# Patient Record
Sex: FEMALE | Race: WHITE | NOT HISPANIC OR LATINO | Employment: FULL TIME | URBAN - NONMETROPOLITAN AREA
[De-identification: names, ages, dates, MRNs, and addresses within clinical notes are randomized per-mention and may not be internally consistent; named-entity substitution may affect disease eponyms.]

---

## 2017-03-02 DIAGNOSIS — Z12.31 VISIT FOR SCREENING MAMMOGRAM: Primary | ICD-10-CM

## 2017-11-21 ENCOUNTER — OFFICE VISIT (OUTPATIENT)
Dept: FAMILY MEDICINE | Facility: OTHER | Age: 42
End: 2017-11-21
Attending: FAMILY MEDICINE
Payer: OTHER GOVERNMENT

## 2017-11-21 ENCOUNTER — TELEPHONE (OUTPATIENT)
Dept: FAMILY MEDICINE | Facility: OTHER | Age: 42
End: 2017-11-21

## 2017-11-21 VITALS
HEIGHT: 65 IN | SYSTOLIC BLOOD PRESSURE: 118 MMHG | BODY MASS INDEX: 33.09 KG/M2 | OXYGEN SATURATION: 98 % | TEMPERATURE: 98.2 F | WEIGHT: 198.6 LBS | DIASTOLIC BLOOD PRESSURE: 74 MMHG | HEART RATE: 105 BPM

## 2017-11-21 DIAGNOSIS — J06.9 VIRAL URI: ICD-10-CM

## 2017-11-21 DIAGNOSIS — R30.0 DYSURIA: Primary | ICD-10-CM

## 2017-11-21 LAB
ALBUMIN UR-MCNC: 10 MG/DL
APPEARANCE UR: CLEAR
BACTERIA #/AREA URNS HPF: ABNORMAL /HPF
BILIRUB UR QL STRIP: NEGATIVE
COLOR UR AUTO: YELLOW
GLUCOSE UR STRIP-MCNC: NEGATIVE MG/DL
HGB UR QL STRIP: NEGATIVE
KETONES UR STRIP-MCNC: NEGATIVE MG/DL
LEUKOCYTE ESTERASE UR QL STRIP: NEGATIVE
MUCOUS THREADS #/AREA URNS LPF: PRESENT /LPF
NITRATE UR QL: NEGATIVE
PH UR STRIP: 5.5 PH (ref 4.7–8)
RBC #/AREA URNS AUTO: 2 /HPF (ref 0–2)
SOURCE: ABNORMAL
SP GR UR STRIP: 1.02 (ref 1–1.03)
SQUAMOUS #/AREA URNS AUTO: 3 /HPF (ref 0–1)
UROBILINOGEN UR STRIP-MCNC: NORMAL MG/DL (ref 0–2)
WBC #/AREA URNS AUTO: 2 /HPF (ref 0–2)

## 2017-11-21 PROCEDURE — 99213 OFFICE O/P EST LOW 20 MIN: CPT | Performed by: FAMILY MEDICINE

## 2017-11-21 PROCEDURE — 81001 URINALYSIS AUTO W/SCOPE: CPT | Performed by: FAMILY MEDICINE

## 2017-11-21 ASSESSMENT — ANXIETY QUESTIONNAIRES
4. TROUBLE RELAXING: NOT AT ALL
5. BEING SO RESTLESS THAT IT IS HARD TO SIT STILL: NOT AT ALL
2. NOT BEING ABLE TO STOP OR CONTROL WORRYING: NOT AT ALL
3. WORRYING TOO MUCH ABOUT DIFFERENT THINGS: NOT AT ALL
6. BECOMING EASILY ANNOYED OR IRRITABLE: NOT AT ALL
1. FEELING NERVOUS, ANXIOUS, OR ON EDGE: NOT AT ALL
GAD7 TOTAL SCORE: 0
7. FEELING AFRAID AS IF SOMETHING AWFUL MIGHT HAPPEN: NOT AT ALL

## 2017-11-21 ASSESSMENT — PAIN SCALES - GENERAL: PAINLEVEL: MODERATE PAIN (4)

## 2017-11-21 ASSESSMENT — ASTHMA QUESTIONNAIRES
QUESTION_1 LAST FOUR WEEKS HOW MUCH OF THE TIME DID YOUR ASTHMA KEEP YOU FROM GETTING AS MUCH DONE AT WORK, SCHOOL OR AT HOME: NONE OF THE TIME
QUESTION_2 LAST FOUR WEEKS HOW OFTEN HAVE YOU HAD SHORTNESS OF BREATH: ONCE OR TWICE A WEEK
ACT_TOTALSCORE: 22
QUESTION_5 LAST FOUR WEEKS HOW WOULD YOU RATE YOUR ASTHMA CONTROL: WELL CONTROLLED
QUESTION_3 LAST FOUR WEEKS HOW OFTEN DID YOUR ASTHMA SYMPTOMS (WHEEZING, COUGHING, SHORTNESS OF BREATH, CHEST TIGHTNESS OR PAIN) WAKE YOU UP AT NIGHT OR EARLIER THAN USUAL IN THE MORNING: ONCE OR TWICE
QUESTION_4 LAST FOUR WEEKS HOW OFTEN HAVE YOU USED YOUR RESCUE INHALER OR NEBULIZER MEDICATION (SUCH AS ALBUTEROL): NOT AT ALL

## 2017-11-21 ASSESSMENT — PATIENT HEALTH QUESTIONNAIRE - PHQ9: SUM OF ALL RESPONSES TO PHQ QUESTIONS 1-9: 0

## 2017-11-21 NOTE — NURSING NOTE
"Chief Complaint   Patient presents with     URI     chest, nasal       Initial /74 (BP Location: Right arm, Patient Position: Sitting, Cuff Size: Adult Regular)  Temp 98.2  F (36.8  C) (Tympanic)  Ht 5' 5\" (1.651 m)  Wt 198 lb 9.6 oz (90.1 kg)  BMI 33.05 kg/m2 Estimated body mass index is 33.05 kg/(m^2) as calculated from the following:    Height as of this encounter: 5' 5\" (1.651 m).    Weight as of this encounter: 198 lb 9.6 oz (90.1 kg).  Medication Reconciliation: complete     Maritza Ardon    "

## 2017-11-21 NOTE — LETTER
My Asthma Action Plan  Name: Shayy Stratton   YOB: 1975  Date: 11/21/2017   My doctor: Jacqui Lee MD   My clinic: [facility] [place]        My Control Medicine: Albuterol  My Rescue Medicine: Albuterol (Proair/Ventolin/Proventil) inhaler inhale 2 puffs every 4 hrs prn   My Asthma Severity: intermittent  Avoid your asthma triggers: smoke, upper respiratory infections, mold, strong odors and fumes, exercise or sports and cold air               GREEN ZONE   Good Control    I feel good    No cough or wheeze    Can work, sleep and play without asthma symptoms       Take your asthma control medicine every day.     1. If exercise triggers your asthma, take your rescue medication    15 minutes before exercise or sports, and    During exercise if you have asthma symptoms  2. Spacer to use with inhaler: If you have a spacer, make sure to use it with your inhaler             YELLOW ZONE Getting Worse  I have ANY of these:    I do not feel good    Cough or wheeze    Chest feels tight    Wake up at night   1. Keep taking your Green Zone medications  2. Start taking your rescue medicine:    every 20 minutes for up to 1 hour. Then every 4 hours for 24-48 hours.  3. If you stay in the Yellow Zone for more than 12-24 hours, contact your doctor.  4. If you do not return to the Green Zone in 12-24 hours or you get worse, start taking your oral steroid medicine if prescribed by your provider.           RED ZONE Medical Alert - Get Help  I have ANY of these:    I feel awful    Medicine is not helping    Breathing getting harder    Trouble walking or talking    Nose opens wide to breathe       1. Take your rescue medicine NOW  2. If your provider has prescribed an oral steroid medicine, start taking it NOW  3. Call your doctor NOW  4. If you are still in the Red Zone after 20 minutes and you have not reached your doctor:    Take your rescue medicine again and    Call 911 or go to the emergency room right  away    See your regular doctor within 2 weeks of an Emergency Room or Urgent Care visit for follow-up treatment.        Electronically signed by: Maritza Ardon, November 21, 2017    Annual Reminders:  Meet with Asthma Educator,  Flu Shot in the Fall, consider Pneumonia Vaccination for patients with asthma (aged 19 and older).    Pharmacy:    [facility] [place] [place] - [address] [facility] [id number] [place] [address], [place] - [address] [facility]  [address]  [facility] [id number] [place] [facility], [place] - [address]  [facility] [address] [place] [facility], [place] - [address]                    Asthma Triggers  How To Control Things That Make Your Asthma Worse    Triggers are things that make your asthma worse.  Look at the list below to help you find your triggers and what you can do about them.  You can help prevent asthma flare-ups by staying away from your triggers.      Trigger                                                          What you can do   Cigarette Smoke  Tobacco smoke can make asthma worse. Do not allow smoking in your home, car or around you.  Be sure no one smokes at a child s day care or school.  If you smoke, ask your health care provider for ways to help you quit.  Ask family members to quit too.  Ask your health care provider for a referral to Quit Plan to help you quit smoking, or call 6-089-677-PLAN.     Colds, Flu, Bronchitis  These are common triggers of asthma. Wash your hands often.  Don t touch your eyes, nose or mouth.  Get a flu shot every year.     Dust Mites  These are tiny bugs that live in cloth or carpet. They are too small to see. Wash sheets and blankets in hot water every week.   Encase pillows and mattress in dust mite proof covers.  Avoid having carpet if you can. If you have carpet, vacuum weekly.   Use a dust mask and HEPA vacuum.   Pollen and Outdoor Mold  Some people are allergic to trees, grass, or weed pollen, or molds. Try to keep your windows  closed.  Limit time out doors when pollen count is high.   Ask you health care provider about taking medicine during allergy season.     Animal Dander  Some people are allergic to skin flakes, urine or saliva from pets with fur or feathers. Keep pets with fur or feathers out of your home.    If you can t keep the pet outdoors, then keep the pet out of your bedroom.  Keep the bedroom door closed.  Keep pets off cloth furniture and away from stuffed toys.     Mice, Rats, and Cockroaches  Some people are allergic to the waste from these pests.   Cover food and garbage.  Clean up spills and food crumbs.  Store grease in the refrigerator.   Keep food out of the bedroom.   Indoor Mold  This can be a trigger if your home has high moisture. Fix leaking faucets, pipes, or other sources of water.   Clean moldy surfaces.  Dehumidify basement if it is damp and smelly.   Smoke, Strong Odors, and Sprays  These can reduce air quality. Stay away from strong odors and sprays, such as perfume, powder, hair spray, paints, smoke incense, paint, cleaning products, candles and new carpet.   Exercise or Sports  Some people with asthma have this trigger. Be active!  Ask your doctor about taking medicine before sports or exercise to prevent symptoms.    Warm up for 5-10 minutes before and after sports or exercise.     Other Triggers of Asthma  Cold air:  Cover your nose and mouth with a scarf.  Sometimes laughing or crying can be a trigger.  Some medicines and food can trigger asthma.

## 2017-11-21 NOTE — PROGRESS NOTES
SUBJECTIVE:   Shayy Stratton is a 42 year old female who presents to clinic today for the following health issues:      RESPIRATORY SYMPTOMS      Duration: Saturday 11/18/17    Description  nasal congestion, sore throat, facial pain/pressure, cough, fever, chills, fatigue/malaise, hoarse voice, nausea and SOB when walking    Severity: moderate    Accompanying signs and symptoms: see above    History (predisposing factors):  asthma    Precipitating or alleviating factors: None    Therapies tried and outcome:  rest and fluids tea for colds, tea vapor rub, some relief    URINARY TRACT SYMPTOMS      Duration: 3-4 days    Description  frequency, urgency and incontinence when coughing    Intensity:  mild    Accompanying signs and symptoms:  Fever/chills: YES  Flank pain no   Nausea and vomiting: no   Vaginal symptoms: none  Abdominal/Pelvic Pain: no     History  History of frequent UTI's: YES  History of kidney stones: no   Sexually Active: YES  Possibility of pregnancy: No    Precipitating or alleviating factors: None    Therapies tried and outcome: cranberry suppliments, D- Mannose powder   Outcome: have helped    Problem list and histories reviewed & adjusted, as indicated.  Additional history: as documented    Current Outpatient Prescriptions   Medication Sig Dispense Refill     Misc Natural Products (CRANBLADDER RELEAF PO)        multivitamin, therapeutic with minerals (MULTI-VITAMIN) TABS Take 1 tablet by mouth daily       albuterol (PROAIR HFA, PROVENTIL HFA, VENTOLIN HFA) 108 (90 BASE) MCG/ACT inhaler Inhale 2 puffs into the lungs every 4 hours as needed 1 Inhaler 1     Cranberry 450 MG TABS Take  by mouth. daily       Probiotic Product (PROBIOTIC & ACIDOPHILUS EX ST) CAPS Take  by mouth. Daily       Labs reviewed in EPIC    Reviewed and updated as needed this visit by clinical staffTobacco  Allergies  Meds       Reviewed and updated as needed this visit by Provider         ROS:  Constitutional, HEENT,  "cardiovascular, pulmonary, gi and gu systems are negative, except as otherwise noted.      OBJECTIVE:                                                    /74 (BP Location: Right arm, Patient Position: Sitting, Cuff Size: Adult Regular)  Pulse 105  Temp 98.2  F (36.8  C) (Tympanic)  Ht 5' 5\" (1.651 m)  Wt 198 lb 9.6 oz (90.1 kg)  SpO2 98%  BMI 33.05 kg/m2  Body mass index is 33.05 kg/(m^2).  GENERAL APPEARANCE: healthy, alert and no distress  HENT: ear canals and TM's normal and nose and mouth without ulcers or lesions  NECK: no adenopathy, no asymmetry, masses, or scars and thyroid normal to palpation  RESP: lungs clear to auscultation - no rales, rhonchi or wheezes  CV: regular rates and rhythm, normal S1 S2, no S3 or S4 and no murmur, click or rub  PSYCH: mentation appears normal and affect normal/bright       ASSESSMENT/PLAN:                                                    1. Dysuria    - UA reflex to Microscopic and Culture    2. Viral URI    1.  Nasal saline rinse/spray 2-3x/day (brands:  Ocean, Ayr, Homar-Med, etc)  2.  Afrin nasal spray (only use 3 days, not longer!)  1-2x/day  3.  claritin or zrytec daily (always buy cheapest brand)    Patient was agreeable to this plan and had no further questions.  See Patient Instructions    Jacqui Lee MD  [facility] [place]  "

## 2017-11-21 NOTE — MR AVS SNAPSHOT
"              After Visit Summary   11/21/2017    Shayy Stratton    MRN: [id number]           Patient Information     Date Of Birth          1975        Visit Information        Provider Department      11/21/2017 2:30 PM Jacqui Lee MD [facility]        Today's Diagnoses     Dysuria    -  1    Viral URI           Follow-ups after your visit        Who to contact     If you have questions or need follow up information about today's clinic visit or your schedule please contact [facility] directly at [phone].  Normal or non-critical lab and imaging results will be communicated to you by Foodisthart, letter or phone within 4 business days after the clinic has received the results. If you do not hear from us within 7 days, please contact the clinic through [employer] or phone. If you have a critical or abnormal lab result, we will notify you by phone as soon as possible.  Submit refill requests through [employer] or call your pharmacy and they will forward the refill request to us. Please allow 3 business days for your refill to be completed.          Additional Information About Your Visit        MyChart Information     [employer] gives you secure access to your electronic health record. If you see a primary care provider, you can also send messages to your care team and make appointments. If you have questions, please call your primary care clinic.  If you do not have a primary care provider, please call [phone] and they will assist you.        Care EveryWhere ID     This is your Care EveryWhere ID. This could be used by other organizations to access your [facility] medical records  [id number]        Your Vitals Were     Pulse Temperature Height Pulse Oximetry BMI (Body Mass Index)       105 98.2  F (36.8  C) (Tympanic) 5' 5\" (1.651 m) 98% 33.05 kg/m2        Blood Pressure from Last 3 Encounters:   11/21/17 118/74   04/14/16 118/74   02/26/16 117/70    Weight from Last 3 " Encounters:   11/21/17 198 lb 9.6 oz (90.1 kg)   04/14/16 185 lb (83.9 kg)   02/26/16 185 lb (83.9 kg)              We Performed the Following     UA reflex to Microscopic and Culture          Today's Medication Changes          These changes are accurate as of: 11/21/17  3:34 PM.  If you have any questions, ask your nurse or doctor.               Stop taking these medicines if you haven't already. Please contact your care team if you have questions.     fluticasone 50 MCG/ACT spray   Commonly known as:  FLONASE   Stopped by:  Jacqui Lee MD           nitroFURantoin (macrocrystal-monohydrate) 100 MG capsule   Commonly known as:  MACROBID   Stopped by:  Jacqui Lee MD                    Primary Care Provider Office Phone # Fax #    Jacqui Lee [phone]       [facility] [place] [address]  [place] [address]        Equal Access to Services     [facility] AH: Hadii aad ku hadasho Soomaali, waaxda luqadaha, qaybta kaalmada adeegyada, waxay idiin hayaan adeeg kharasteven la'aurean . So [facility] [phone].    ATENCIÓN: Si habla español, tiene a huizar disposición servicios gratuitos de asistencia lingüística. Llame al [phone].    We comply with applicable federal civil rights laws and [place] laws. We do not discriminate on the basis of race, color, national origin, age, disability, sex, sexual orientation, or gender identity.            Thank you!     Thank you for choosing [facility] [place]  for your care. Our goal is always to provide you with excellent care. Hearing back from our patients is one way we can continue to improve our services. Please take a few minutes to complete the written survey that you may receive in the mail after your visit with us. Thank you!             Your Updated Medication List - Protect others around you: Learn how to safely use, store and throw away your medicines at www.disposemymeds.org.          This list is accurate as of:  11/21/17  3:34 PM.  Always use your most recent med list.                   Brand Name Dispense Instructions for use Diagnosis    albuterol 108 (90 BASE) MCG/ACT Inhaler    PROAIR HFA/PROVENTIL HFA/VENTOLIN HFA    1 Inhaler    Inhale 2 puffs into the lungs every 4 hours as needed    Allergic rhinitis, cause unspecified       Cranberry 450 MG Tabs      Take  by mouth. daily        CRANBLADDER RELEAF PO           Multi-vitamin Tabs tablet      Take 1 tablet by mouth daily        PROBIOTIC & ACIDOPHILUS EX ST Caps      Take  by mouth. Daily

## 2017-11-21 NOTE — TELEPHONE ENCOUNTER
9:08 AM    Reason for Call: OVERBOOK    Patient is having the following symptoms: Poss bronchitis for 4 days.    The patient is requesting an appointment for ASAP with Dr Lee.    Was an appointment offered for this call? Yes  If yes : Appointment type short              Date  12/05/17    Preferred method for responding to this message: Telephone Call  What is your phone number ?  [phone]    If we cannot reach you directly, may we leave a detailed response at the number you provided? Yes    Can this message wait until your PCP/provider returns, if unavailable today? Not applicable    Benita Leyva

## 2017-11-22 ASSESSMENT — ANXIETY QUESTIONNAIRES: GAD7 TOTAL SCORE: 0

## 2017-11-22 ASSESSMENT — ASTHMA QUESTIONNAIRES: ACT_TOTALSCORE: 22

## 2018-01-08 ENCOUNTER — TELEPHONE (OUTPATIENT)
Dept: FAMILY MEDICINE | Facility: OTHER | Age: 43
End: 2018-01-08

## 2018-01-08 ENCOUNTER — TRANSFERRED RECORDS (OUTPATIENT)
Dept: HEALTH INFORMATION MANAGEMENT | Facility: HOSPITAL | Age: 43
End: 2018-01-08

## 2018-01-08 NOTE — TELEPHONE ENCOUNTER
8:46 AM    Reason for Call: Phone Call    Description: Pt called and states that she would like to see if she could get a lab put in for a possible UTI, she would like to see if she could get it sent into [place] [facility]     Was an appointment offered for this call? No  If yes : Appointment type              Date    Preferred method for responding to this message: Telephone Call  What is your phone number ?    If we cannot reach you directly, may we leave a detailed response at the number you provided? Yes    Can this message wait until your PCP/provider returns, if available today? Not applicable, PCP is in     [facility]

## 2018-01-18 ENCOUNTER — OFFICE VISIT (OUTPATIENT)
Dept: FAMILY MEDICINE | Facility: OTHER | Age: 43
End: 2018-01-18
Attending: FAMILY MEDICINE
Payer: OTHER GOVERNMENT

## 2018-01-18 VITALS
SYSTOLIC BLOOD PRESSURE: 120 MMHG | HEIGHT: 65 IN | HEART RATE: 82 BPM | TEMPERATURE: 98.4 F | DIASTOLIC BLOOD PRESSURE: 88 MMHG | BODY MASS INDEX: 33.89 KG/M2 | WEIGHT: 203.4 LBS | OXYGEN SATURATION: 98 % | RESPIRATION RATE: 16 BRPM

## 2018-01-18 DIAGNOSIS — Z87.440 PERSONAL HISTORY OF URINARY TRACT INFECTION: Primary | ICD-10-CM

## 2018-01-18 LAB
ALBUMIN UR-MCNC: NEGATIVE MG/DL
APPEARANCE UR: CLEAR
BILIRUB UR QL STRIP: NEGATIVE
COLOR UR AUTO: YELLOW
GLUCOSE UR STRIP-MCNC: NEGATIVE MG/DL
HGB UR QL STRIP: NEGATIVE
KETONES UR STRIP-MCNC: NEGATIVE MG/DL
LEUKOCYTE ESTERASE UR QL STRIP: NEGATIVE
NITRATE UR QL: NEGATIVE
PH UR STRIP: 5.5 PH (ref 4.7–8)
SOURCE: NORMAL
SP GR UR STRIP: 1.02 (ref 1–1.03)
UROBILINOGEN UR STRIP-MCNC: NORMAL MG/DL (ref 0–2)

## 2018-01-18 PROCEDURE — 81003 URINALYSIS AUTO W/O SCOPE: CPT | Performed by: FAMILY MEDICINE

## 2018-01-18 PROCEDURE — 99213 OFFICE O/P EST LOW 20 MIN: CPT | Performed by: FAMILY MEDICINE

## 2018-01-18 ASSESSMENT — PATIENT HEALTH QUESTIONNAIRE - PHQ9: SUM OF ALL RESPONSES TO PHQ QUESTIONS 1-9: 4

## 2018-01-18 ASSESSMENT — ANXIETY QUESTIONNAIRES
1. FEELING NERVOUS, ANXIOUS, OR ON EDGE: SEVERAL DAYS
5. BEING SO RESTLESS THAT IT IS HARD TO SIT STILL: SEVERAL DAYS
6. BECOMING EASILY ANNOYED OR IRRITABLE: SEVERAL DAYS
GAD7 TOTAL SCORE: 7
3. WORRYING TOO MUCH ABOUT DIFFERENT THINGS: SEVERAL DAYS
IF YOU CHECKED OFF ANY PROBLEMS ON THIS QUESTIONNAIRE, HOW DIFFICULT HAVE THESE PROBLEMS MADE IT FOR YOU TO DO YOUR WORK, TAKE CARE OF THINGS AT HOME, OR GET ALONG WITH OTHER PEOPLE: SOMEWHAT DIFFICULT
2. NOT BEING ABLE TO STOP OR CONTROL WORRYING: SEVERAL DAYS
7. FEELING AFRAID AS IF SOMETHING AWFUL MIGHT HAPPEN: SEVERAL DAYS
4. TROUBLE RELAXING: SEVERAL DAYS

## 2018-01-18 ASSESSMENT — PAIN SCALES - GENERAL: PAINLEVEL: NO PAIN (0)

## 2018-01-18 NOTE — NURSING NOTE
"Chief Complaint   Patient presents with     RECHECK     follow up UTI       Initial /90 (BP Location: Left arm, Patient Position: Sitting, Cuff Size: Adult Regular)  Pulse 82  Temp 98.4  F (36.9  C) (Tympanic)  Resp 16  Ht 5' 5\" (1.651 m)  Wt 203 lb 6.4 oz (92.3 kg)  SpO2 98%  BMI 33.85 kg/m2 Estimated body mass index is 33.85 kg/(m^2) as calculated from the following:    Height as of this encounter: 5' 5\" (1.651 m).    Weight as of this encounter: 203 lb 6.4 oz (92.3 kg).  Medication Reconciliation: complete   Emily Jacques      "

## 2018-01-18 NOTE — PROGRESS NOTES
"  SUBJECTIVE:   Shayy Stratton is a 42 year old female who presents to clinic today for the following health issues:      URINARY TRACT SYMPTOMS      Duration: 2 weeks    Description  follow-up UTI    Intensity:  mild    Accompanying signs and symptoms:  Fever/chills: no   Flank pain no   Nausea and vomiting: no   Vaginal symptoms: none  Abdominal/Pelvic Pain: no     History  History of frequent UTI's: YES  History of kidney stones: no   Sexually Active: YES  Possibility of pregnancy: No    Precipitating or alleviating factors: None    Therapies tried and outcome: course of antibiotics - finished on 1/13/2018, cranberry juice and increase fluid intake   Outcome: Patient states she feels like UTI is gone      Problem list and histories reviewed & adjusted, as indicated.  Additional history: as documented    Current Outpatient Prescriptions   Medication Sig Dispense Refill     D-Mannose POWD Take 1 tsp. by mouth daily       multivitamin, therapeutic with minerals (MULTI-VITAMIN) TABS Take 1 tablet by mouth daily       albuterol (PROAIR HFA, PROVENTIL HFA, VENTOLIN HFA) 108 (90 BASE) MCG/ACT inhaler Inhale 2 puffs into the lungs every 4 hours as needed 1 Inhaler 1     Cranberry 450 MG TABS Take  by mouth. daily       Probiotic Product (PROBIOTIC & ACIDOPHILUS EX ST) CAPS Take  by mouth. Daily       Labs reviewed in EPIC    Reviewed and updated as needed this visit by clinical staffTobacco  Allergies  Meds  Med Hx  Surg Hx  Fam Hx  Soc Hx      Reviewed and updated as needed this visit by Provider         ROS:  Constitutional, HEENT, cardiovascular, pulmonary, gi and gu systems are negative, except as otherwise noted.      OBJECTIVE:                                                    /88  Pulse 82  Temp 98.4  F (36.9  C) (Tympanic)  Resp 16  Ht 5' 5\" (1.651 m)  Wt 203 lb 6.4 oz (92.3 kg)  SpO2 98%  BMI 33.85 kg/m2  Body mass index is 33.85 kg/(m^2).  GENERAL APPEARANCE: healthy, alert and no " distress  RESP: lungs clear to auscultation - no rales, rhonchi or wheezes  CV: regular rates and rhythm, normal S1 S2, no S3 or S4 and no murmur, click or rub  ABDOMEN: soft, nontender, without hepatosplenomegaly or masses and bowel sounds normal  MS: extremities normal- no gross deformities noted and no CVA tenderness  PSYCH: mentation appears normal and affect normal/bright       ASSESSMENT/PLAN:                                                    1. Personal history of urinary tract infection  resolved  - UA reflex to Microscopic and Culture - [place]    Patient was agreeable to this plan and had no further questions.  See Patient Instructions    Jacqui Lee MD  [facility]

## 2018-01-18 NOTE — MR AVS SNAPSHOT
"              After Visit Summary   1/18/2018    Shayy Stratton    MRN: [id number]           Patient Information     Date Of Birth          1975        Visit Information        Provider Department      1/18/2018 4:00 PM [facility]        Today's Diagnoses     Personal history of urinary tract infection    -  1       Follow-ups after your visit        Who to contact     If you have questions or need follow up information about today's clinic visit or your schedule please contact [facility] directly at [phone].  Normal or non-critical lab and imaging results will be communicated to you by [employer], letter or phone within 4 business days after the clinic has received the results. If you do not hear from us within 7 days, please contact the clinic through [employer] or phone. If you have a critical or abnormal lab result, we will notify you by phone as soon as possible.  Submit refill requests through [employer] or call your pharmacy and they will forward the refill request to us. Please allow 3 business days for your refill to be completed.          Additional Information About Your Visit        MyChart Information     [employer] gives you secure access to your electronic health record. If you see a primary care provider, you can also send messages to your care team and make appointments. If you have questions, please call your primary care clinic.  If you do not have a primary care provider, please call [phone] and they will assist you.        Care EveryWhere ID     This is your Care EveryWhere ID. This could be used by other organizations to access your [facility] medical records  [id number]        Your Vitals Were     Pulse Temperature Respirations Height Pulse Oximetry BMI (Body Mass Index)    82 98.4  F (36.9  C) (Tympanic) 16 5' 5\" (1.651 m) 98% 33.85 kg/m2       Blood Pressure from Last 3 Encounters:   01/18/18 120/88   11/21/17 118/74   04/14/16 118/74    " Weight from Last 3 Encounters:   01/18/18 203 lb 6.4 oz (92.3 kg)   11/21/17 198 lb 9.6 oz (90.1 kg)   04/14/16 185 lb (83.9 kg)              We Performed the Following     UA reflex to Microscopic and Culture - [place]          Today's Medication Changes          These changes are accurate as of: 1/18/18  4:52 PM.  If you have any questions, ask your nurse or doctor.               Stop taking these medicines if you haven't already. Please contact your care team if you have questions.     CRANBLADDER RELEAF PO   Stopped by:  Jacqui Lee MD                    Primary Care Provider Office Phone # Fax #    Jacqui Lee [phone]       [facility] [place] [address]  [place] [address]        Equal Access to Services     [facility] : Hadii aad ku hadasho Sokaylene, waaxda luqadaha, qaybta kaalmada jarad, lea davidson . So [facility] [phone].    ATENCIÓN: Si habla español, tiene a huizar disposición servicios gratuitos de asistencia lingüística. Sierra al [phone].    We comply with applicable federal civil rights laws and [place] laws. We do not discriminate on the basis of race, color, national origin, age, disability, sex, sexual orientation, or gender identity.            Thank you!     Thank you for choosing [facility] [place]  for your care. Our goal is always to provide you with excellent care. Hearing back from our patients is one way we can continue to improve our services. Please take a few minutes to complete the written survey that you may receive in the mail after your visit with us. Thank you!             Your Updated Medication List - Protect others around you: Learn how to safely use, store and throw away your medicines at www.disposemymeds.org.          This list is accurate as of: 1/18/18  4:52 PM.  Always use your most recent med list.                   Brand Name Dispense Instructions for use Diagnosis    albuterol 108 (90  BASE) MCG/ACT Inhaler    PROAIR HFA/PROVENTIL HFA/VENTOLIN HFA    1 Inhaler    Inhale 2 puffs into the lungs every 4 hours as needed    Allergic rhinitis, cause unspecified       Cranberry 450 MG Tabs      Take  by mouth. daily        D-Mannose Powd      Take 1 tsp. by mouth daily        Multi-vitamin Tabs tablet      Take 1 tablet by mouth daily        PROBIOTIC & ACIDOPHILUS EX ST Caps      Take  by mouth. Daily

## 2018-01-19 ASSESSMENT — ANXIETY QUESTIONNAIRES: GAD7 TOTAL SCORE: 7

## 2018-02-02 ENCOUNTER — TELEPHONE (OUTPATIENT)
Dept: FAMILY MEDICINE | Facility: OTHER | Age: 43
End: 2018-02-02

## 2018-02-02 DIAGNOSIS — R30.0 DYSURIA: Primary | ICD-10-CM

## 2018-02-02 DIAGNOSIS — R82.90 ABNORMAL URINE FINDINGS: Primary | ICD-10-CM

## 2018-02-02 DIAGNOSIS — R30.0 DYSURIA: ICD-10-CM

## 2018-02-02 LAB
ALBUMIN UR-MCNC: NEGATIVE MG/DL
APPEARANCE UR: CLEAR
BACTERIA #/AREA URNS HPF: ABNORMAL /HPF
BILIRUB UR QL STRIP: NEGATIVE
COLOR UR AUTO: YELLOW
GLUCOSE UR STRIP-MCNC: NEGATIVE MG/DL
HGB UR QL STRIP: NEGATIVE
KETONES UR STRIP-MCNC: NEGATIVE MG/DL
LEUKOCYTE ESTERASE UR QL STRIP: ABNORMAL
MUCOUS THREADS #/AREA URNS LPF: PRESENT /LPF
NITRATE UR QL: NEGATIVE
PH UR STRIP: 6 PH (ref 4.7–8)
RBC #/AREA URNS AUTO: 2 /HPF (ref 0–2)
SOURCE: ABNORMAL
SP GR UR STRIP: 1.02 (ref 1–1.03)
UROBILINOGEN UR STRIP-MCNC: NORMAL MG/DL (ref 0–2)
WBC #/AREA URNS AUTO: 13 /HPF (ref 0–2)

## 2018-02-02 PROCEDURE — 81001 URINALYSIS AUTO W/SCOPE: CPT | Performed by: FAMILY MEDICINE

## 2018-02-02 PROCEDURE — 87086 URINE CULTURE/COLONY COUNT: CPT | Performed by: FAMILY MEDICINE

## 2018-02-02 NOTE — TELEPHONE ENCOUNTER
8:27 AM    Reason for Call: Phone Call    Description: Pt called and stated she believes she has a UTI. Pt has had burning during urination and more frequency of urination. Pt stated she was told by Dr Lee at last appt that she could just come in for labs next time she felt one starting. Pt wondering if someone can put lab orders in? Please call her back at [phone]    Was an appointment offered for this call? No  If yes : Appointment type              Date    Preferred method for responding to this message: Telephone Call  What is your phone number ?    If we cannot reach you directly, may we leave a detailed response at the number you provided? Yes    Can this message wait until your PCP/provider returns, if available today? No, PCP out and would like call back today    Benita Leyva

## 2018-02-04 LAB
BACTERIA SPEC CULT: ABNORMAL
SPECIMEN SOURCE: ABNORMAL

## 2018-02-15 ENCOUNTER — MYC MEDICAL ADVICE (OUTPATIENT)
Dept: FAMILY MEDICINE | Facility: OTHER | Age: 43
End: 2018-02-15

## 2018-02-22 ENCOUNTER — OFFICE VISIT (OUTPATIENT)
Dept: FAMILY MEDICINE | Facility: OTHER | Age: 43
End: 2018-02-22
Attending: FAMILY MEDICINE
Payer: OTHER GOVERNMENT

## 2018-02-22 VITALS
SYSTOLIC BLOOD PRESSURE: 122 MMHG | OXYGEN SATURATION: 100 % | RESPIRATION RATE: 18 BRPM | WEIGHT: 204.4 LBS | HEIGHT: 65 IN | DIASTOLIC BLOOD PRESSURE: 70 MMHG | HEART RATE: 97 BPM | TEMPERATURE: 97.7 F | BODY MASS INDEX: 34.05 KG/M2

## 2018-02-22 DIAGNOSIS — R39.15 URINARY URGENCY: Primary | ICD-10-CM

## 2018-02-22 DIAGNOSIS — M77.12 LATERAL EPICONDYLITIS OF LEFT ELBOW: ICD-10-CM

## 2018-02-22 LAB
ALBUMIN UR-MCNC: NEGATIVE MG/DL
APPEARANCE UR: CLEAR
BILIRUB UR QL STRIP: NEGATIVE
COLOR UR AUTO: NORMAL
GLUCOSE UR STRIP-MCNC: NEGATIVE MG/DL
HGB UR QL STRIP: NEGATIVE
KETONES UR STRIP-MCNC: NEGATIVE MG/DL
LEUKOCYTE ESTERASE UR QL STRIP: NEGATIVE
NITRATE UR QL: NEGATIVE
PH UR STRIP: 6 PH (ref 4.7–8)
SOURCE: NORMAL
SP GR UR STRIP: 1 (ref 1–1.03)
UROBILINOGEN UR STRIP-MCNC: NORMAL MG/DL (ref 0–2)

## 2018-02-22 PROCEDURE — 99213 OFFICE O/P EST LOW 20 MIN: CPT | Performed by: FAMILY MEDICINE

## 2018-02-22 PROCEDURE — 81003 URINALYSIS AUTO W/O SCOPE: CPT | Performed by: FAMILY MEDICINE

## 2018-02-22 ASSESSMENT — PAIN SCALES - GENERAL: PAINLEVEL: NO PAIN (0)

## 2018-02-22 NOTE — MR AVS SNAPSHOT
"              After Visit Summary   2/22/2018    Shayy Stratton    MRN: [id number]           Patient Information     Date Of Birth          1975        Visit Information        Provider Department      2/22/2018 12:15 PM Jacqui Lee MD [facility]        Today's Diagnoses     Urinary urgency    -  1    Lateral epicondylitis of left elbow           Follow-ups after your visit        Who to contact     If you have questions or need follow up information about today's clinic visit or your schedule please contact [facility] directly at [phone].  Normal or non-critical lab and imaging results will be communicated to you by [employer], letter or phone within 4 business days after the clinic has received the results. If you do not hear from us within 7 days, please contact the clinic through [employer] E[employer] or phone. If you have a critical or abnormal lab result, we will notify you by phone as soon as possible.  Submit refill requests through MetGen or call your pharmacy and they will forward the refill request to us. Please allow 3 business days for your refill to be completed.          Additional Information About Your Visit        MyChart Information     Met[employer] gives you secure access to your electronic health record. If you see a primary care provider, you can also send messages to your care team and make appointments. If you have questions, please call your primary care clinic.  If you do not have a primary care provider, please call [phone] and they will assist you.        Care EveryWhere ID     This is your Care EveryWhere ID. This could be used by other organizations to access your [place] medical records  [id number]        Your Vitals Were     Pulse Temperature Respirations Height Pulse Oximetry BMI (Body Mass Index)    97 97.7  F (36.5  C) (Tympanic) 18 5' 5\" (1.651 m) 100% 34.01 kg/m2       Blood Pressure from Last 3 Encounters:   02/22/18 122/70   01/18/18 120/88 "   11/21/17 118/74    Weight from Last 3 Encounters:   02/22/18 204 lb 6.4 oz (92.7 kg)   01/18/18 203 lb 6.4 oz (92.3 kg)   11/21/17 198 lb 9.6 oz (90.1 kg)              We Performed the Following     UA reflex to Microscopic and Culture - [place]          Today's Medication Changes          These changes are accurate as of 2/22/18  1:17 PM.  If you have any questions, ask your nurse or doctor.               Start taking these medicines.        Dose/Directions    order for DME   Used for:  Lateral epicondylitis of left elbow   Started by:  Jacqui Lee MD        Equipment being ordered: Tennis elbow splint   Quantity:  1 Device   Refills:  0            Where to get your medicines      Some of these will need a paper prescription and others can be bought over the counter.  Ask your nurse if you have questions.     Bring a paper prescription for each of these medications     order for DME                Primary Care Provider Office Phone # Fax #    Jacqui Lee [phone]       [facility] [place] [address] [address]        Equal Access to Services     [facility]: Hadii glenny urbina hadasho Sokaylene, waaxda luqadaha, qaybta kaalmada jarad, lea hobbs. So [facility] [phone].    ATENCIÓN: Si habla español, tiene a huizar disposición servicios gratuitos de asistencia lingüística. Llame al [phone].    We comply with applicable federal civil rights laws and [place] laws. We do not discriminate on the basis of race, color, national origin, age, disability, sex, sexual orientation, or gender identity.            Thank you!     Thank you for choosing [facility]  for your care. Our goal is always to provide you with excellent care. Hearing back from our patients is one way we can continue to improve our services. Please take a few minutes to complete the written survey that you may receive in the mail after your visit with us.  Thank you!             Your Updated Medication List - Protect others around you: Learn how to safely use, store and throw away your medicines at www.disposemymeds.org.          This list is accurate as of 2/22/18  1:17 PM.  Always use your most recent med list.                   Brand Name Dispense Instructions for use Diagnosis    albuterol 108 (90 BASE) MCG/ACT Inhaler    PROAIR HFA/PROVENTIL HFA/VENTOLIN HFA    1 Inhaler    Inhale 2 puffs into the lungs every 4 hours as needed    Allergic rhinitis, cause unspecified       Cranberry 450 MG Tabs      Take  by mouth. daily        D-Mannose Powd      Take 1 tsp. by mouth daily        Multi-vitamin Tabs tablet      Take 1 tablet by mouth daily        order for DME     1 Device    Equipment being ordered: Tennis elbow splint    Lateral epicondylitis of left elbow       PROBIOTIC & ACIDOPHILUS EX ST Caps      Take  by mouth. Daily

## 2018-02-22 NOTE — NURSING NOTE
"Chief Complaint   Patient presents with     UTI     Musculoskeletal Problem     Rt elbow       Initial /70 (BP Location: Right arm, Patient Position: Sitting, Cuff Size: Adult Regular)  Pulse 97  Temp 97.7  F (36.5  C) (Tympanic)  Resp 18  Ht 5' 5\" (1.651 m)  Wt 204 lb 6.4 oz (92.7 kg)  SpO2 100%  BMI 34.01 kg/m2 Estimated body mass index is 34.01 kg/(m^2) as calculated from the following:    Height as of this encounter: 5' 5\" (1.651 m).    Weight as of this encounter: 204 lb 6.4 oz (92.7 kg).  Medication Reconciliation: complete   Emily Lizarraga MA  "

## 2018-02-22 NOTE — PROGRESS NOTES
SUBJECTIVE:                                                    Shayy Stratton is a 42 year old female who presents to clinic today for the following health issues:      Musculoskeletal problem/pain      Duration: January    Description  Location: Left elbow    Intensity:  moderate    Accompanying signs and symptoms: sharp pain if lifting or turning wrong    History  Previous similar problem: no   Previous evaluation:  none    Precipitating or alleviating factors:  Trauma or overuse: no   Aggravating factors include: lifting and exercise    Therapies tried and outcome: heat    URINARY TRACT SYMPTOMS      Duration: on and off for several years    Description  frequency, urgency and incontinence    Intensity:  mild    Accompanying signs and symptoms:  Fever/chills: no   Flank pain no   Nausea and vomiting: no   Vaginal symptoms: none  Abdominal/Pelvic Pain: no     History  History of frequent UTI's: YES- since pt was little  History of kidney stones: no   Sexually Active: YES  Possibility of pregnancy: Don't Know    Precipitating or alleviating factors: None    Therapies tried and outcome: cranberry juice   Outcome: no change      Problem list and histories reviewed & adjusted, as indicated.  Additional history: as documented    Patient Active Problem List   Diagnosis     Frequent UTI     Hyperlipidemia with target LDL less than 130     Esophageal reflux     Past Surgical History:   Procedure Laterality Date     APPENDECTOMY       reflux bladder surgery       TONSILLECTOMY         Social History   Substance Use Topics     Smoking status: Never Smoker     Smokeless tobacco: Never Used     Alcohol use 1.2 oz/week     2 Standard drinks or equivalent per week      Comment: occasionally 4-6  drinks weekly     Family History   Problem Relation Age of Onset     DIABETES Mother      Hypertension Mother      Breast Cancer Mother 65     stage 1 -- double mastectomy     CANCER Father      renal cancer/bladder cancer     DIABETES  "Father      diet controlled     Genetic Disorder Sister      mosaic Norton's     Family History Negative Sister      Breast Cancer Maternal Grandmother 48     C.A.D. Maternal Grandfather 62     smoker     Unknown/Adopted Paternal Grandmother      CHF, old age -- 100     C.A.D. Paternal Grandfather 79     smoker     Obesity Brother      possible dyslipidemia         Current Outpatient Prescriptions   Medication Sig Dispense Refill     order for DME Equipment being ordered: Tennis elbow splint 1 Device 0     D-Mannose POWD Take 1 tsp. by mouth daily       multivitamin, therapeutic with minerals (MULTI-VITAMIN) TABS Take 1 tablet by mouth daily       albuterol (PROAIR HFA, PROVENTIL HFA, VENTOLIN HFA) 108 (90 BASE) MCG/ACT inhaler Inhale 2 puffs into the lungs every 4 hours as needed 1 Inhaler 1     Cranberry 450 MG TABS Take  by mouth. daily       Probiotic Product (PROBIOTIC & ACIDOPHILUS EX ST) CAPS Take  by mouth. Daily       Allergies   Allergen Reactions     Levofloxacin Hives     Levaquin - hives on face     Acetaminophen Hives and Rash     Honey-[place]       Aspirin Hives and Rash     Honey-[place]     Calcium Carbonate Hives and Rash     Honey-[place]     Citric Acid Hives and Rash     Honey-[place]     Potassium Bicarbonate Hives and Rash     Honey-[place]     Sodium Bicarbonate Hives and Rash     Honey-[place]     Labs reviewed in EPIC    ROS:  Constitutional, HEENT, cardiovascular, pulmonary, gi and gu systems are negative, except as otherwise noted.    OBJECTIVE:                                                    /70 (BP Location: Right arm, Patient Position: Sitting, Cuff Size: Adult Regular)  Pulse 97  Temp 97.7  F (36.5  C) (Tympanic)  Resp 18  Ht 5' 5\" (1.651 m)  Wt 204 lb 6.4 oz (92.7 kg)  SpO2 100%  BMI 34.01 kg/m2  Body mass index is 34.01 kg/(m^2).  GENERAL APPEARANCE: healthy, alert and no distress  ABDOMEN: soft, nontender, without hepatosplenomegaly or masses and bowel sounds normal  MS: " extremities normal- no gross deformities noted  ORTHO: Elbow Exam: Inspection: no swelling, no ecchymosis  Tender: lateral epicondyle, common extensor tendon and extensor muscle of forearm  Non-tender: medial epicondyle, common flexor tendon, flexor muscle of forearm, supracondylar notch and olecranon bursa  Range of Motion: flexion: normal, extension: normal, supination:  normal, pronation: normal  Strength: not done  Special tests: pain with resisted wrist extension, no pain with resisted middle finger extension, no pain with resisted wrist flexion, no pain with resisted supination:     PSYCH: mentation appears normal and affect normal/bright       ASSESSMENT/PLAN:                                                    1. Urinary urgency  Urine is clear, continue with supplements  - UA reflex to Microscopic and Culture - [place]    2. Lateral epicondylitis of left elbow  [facility] as [facility]  - order for DME; Equipment being ordered: Tennis elbow splint  Dispense: 1 Device; Refill: 0    Patient was agreeable to this plan and had no further questions.  See Patient Instructions    Jacqui Lee MD  [facility] [place]

## 2018-03-08 ENCOUNTER — TELEPHONE (OUTPATIENT)
Dept: FAMILY MEDICINE | Facility: OTHER | Age: 43
End: 2018-03-08

## 2018-03-08 NOTE — TELEPHONE ENCOUNTER
12:15 PM    Reason for Call: Phone Call    Description: Pt called and stated she has been sick with cough and sinus congestion for past week. Pt has tried to take Claritin D and flonase for the sinus congestion/pressure, but it doesn't seem to be helping. Wondering what else over the counter she can use to help with her sinuses? Please call her back at [phone]    Was an appointment offered for this call? No  If yes : Appointment type              Date    Preferred method for responding to this message: Telephone Call  What is your phone number ?    If we cannot reach you directly, may we leave a detailed response at the number you provided? Yes    Can this message wait until your PCP/provider returns, if available today? No, PCP out and hoping someone will call back today    Benita Leyva

## 2018-03-09 ENCOUNTER — OFFICE VISIT (OUTPATIENT)
Dept: FAMILY MEDICINE | Facility: OTHER | Age: 43
End: 2018-03-09
Attending: FAMILY MEDICINE
Payer: OTHER GOVERNMENT

## 2018-03-09 VITALS
SYSTOLIC BLOOD PRESSURE: 116 MMHG | HEART RATE: 94 BPM | WEIGHT: 200.13 LBS | TEMPERATURE: 98.1 F | BODY MASS INDEX: 33.3 KG/M2 | DIASTOLIC BLOOD PRESSURE: 84 MMHG | OXYGEN SATURATION: 98 %

## 2018-03-09 DIAGNOSIS — J06.9 VIRAL URI: Primary | ICD-10-CM

## 2018-03-09 PROCEDURE — 99213 OFFICE O/P EST LOW 20 MIN: CPT | Performed by: FAMILY MEDICINE

## 2018-03-09 ASSESSMENT — ANXIETY QUESTIONNAIRES
5. BEING SO RESTLESS THAT IT IS HARD TO SIT STILL: NOT AT ALL
7. FEELING AFRAID AS IF SOMETHING AWFUL MIGHT HAPPEN: NOT AT ALL
6. BECOMING EASILY ANNOYED OR IRRITABLE: NOT AT ALL
GAD7 TOTAL SCORE: 0
3. WORRYING TOO MUCH ABOUT DIFFERENT THINGS: NOT AT ALL
4. TROUBLE RELAXING: NOT AT ALL
2. NOT BEING ABLE TO STOP OR CONTROL WORRYING: NOT AT ALL
1. FEELING NERVOUS, ANXIOUS, OR ON EDGE: NOT AT ALL

## 2018-03-09 ASSESSMENT — PAIN SCALES - GENERAL: PAINLEVEL: NO PAIN (0)

## 2018-03-09 NOTE — NURSING NOTE
"Chief Complaint   Patient presents with     URI     Sinus Problem       Initial /84  Pulse 94  Temp 98.1  F (36.7  C) (Tympanic)  Wt 200 lb 2 oz (90.8 kg)  SpO2 98%  BMI 33.3 kg/m2 Estimated body mass index is 33.3 kg/(m^2) as calculated from the following:    Height as of 2/22/18: 5' 5\" (1.651 m).    Weight as of this encounter: 200 lb 2 oz (90.8 kg).  Medication Reconciliation: complete     Rachel Amador    "

## 2018-03-09 NOTE — MR AVS SNAPSHOT
After Visit Summary   3/9/2018    Shayy Stratton    MRN: [id number]           Patient Information     Date Of Birth          1975        Visit Information        Provider Department      3/9/2018 9:30 AM Jacqui Lee MD [facility]        Care Instructions      1.  Nasal saline rinse/spray 2-3x/day (brands:  Ocean, Ayr, Homar-Med, etc)  2.  Afrin nasal spray (only use 3 days, not longer!)  1-2x/day  3.  claritin or zrytec daily (always buy cheapest brand)            Follow-ups after your visit        Who to contact     If you have questions or need follow up information about today's clinic visit or your schedule please contact [facility] directly at [phone].  Normal or non-critical lab and imaging results will be communicated to you by MyChart, letter or phone within 4 business days after the clinic has received the results. If you do not hear from us within 7 days, please contact the clinic through MyChart or phone. If you have a critical or abnormal lab result, we will notify you by phone as soon as possible.  Submit refill requests through [employer] or call your pharmacy and they will forward the refill request to us. Please allow 3 business days for your refill to be completed.          Additional Information About Your Visit        MyChart Information     [employer] gives you secure access to your electronic health record. If you see a primary care provider, you can also send messages to your care team and make appointments. If you have questions, please call your primary care clinic.  If you do not have a primary care provider, please call [phone] and they will assist you.        Care EveryWhere ID     This is your Care EveryWhere ID. This could be used by other organizations to access your [place] medical records  [id number]        Your Vitals Were     Pulse Temperature Pulse Oximetry BMI (Body Mass Index)          94 98.1  F (36.7  C) (Tympanic) 98%  33.3 kg/m2         Blood Pressure from Last 3 Encounters:   03/09/18 116/84   02/22/18 122/70   01/18/18 120/88    Weight from Last 3 Encounters:   03/09/18 200 lb 2 oz (90.8 kg)   02/22/18 204 lb 6.4 oz (92.7 kg)   01/18/18 203 lb 6.4 oz (92.3 kg)              Today, you had the following     No orders found for display       Primary Care Provider Office Phone # Fax #    Jacqui Lee [phone]       [facility] [place] [address]  [place] [address]        Equal Access to Services     [facility]: Hadii aad ku hadasho Somamadouali, waaxda luqadaha, qaybta kaalmada adeegyada, lea davidson . So [facility] [phone].    ATENCIÓN: Si habla español, tiene a huizar disposición servicios gratuitos de asistencia lingüística. Llame al [phone].    We comply with applicable federal civil rights laws and [place] laws. We do not discriminate on the basis of race, color, national origin, age, disability, sex, sexual orientation, or gender identity.            Thank you!     Thank you for choosing [facility]  for your care. Our goal is always to provide you with excellent care. Hearing back from our patients is one way we can continue to improve our services. Please take a few minutes to complete the written survey that you may receive in the mail after your visit with us. Thank you!             Your Updated Medication List - Protect others around you: Learn how to safely use, store and throw away your medicines at www.disposemymeds.org.          This list is accurate as of 3/9/18  9:41 AM.  Always use your most recent med list.                   Brand Name Dispense Instructions for use Diagnosis    albuterol 108 (90 BASE) MCG/ACT Inhaler    PROAIR HFA/PROVENTIL HFA/VENTOLIN HFA    1 Inhaler    Inhale 2 puffs into the lungs every 4 hours as needed    Allergic rhinitis, cause unspecified       Cranberry 450 MG Tabs      Take  by mouth. daily        D-Mannose  Powd      Take 1 tsp. by mouth daily        Multi-vitamin Tabs tablet      Take 1 tablet by mouth daily        order for DME     1 Device    Equipment being ordered: Tennis elbow splint    Lateral epicondylitis of left elbow       PROBIOTIC & ACIDOPHILUS EX ST Caps      Take  by mouth. Daily

## 2018-03-09 NOTE — PROGRESS NOTES
SUBJECTIVE:                                                    Shayy Stratton is a 42 year old female who presents to clinic today for the following health issues:      RESPIRATORY SYMPTOMS      Duration: started sunday    Description  nasal congestion, sore throat, facial pain/pressure, cough, fever, headache, fatigue/malaise, hoarse voice and diarrhea    Severity: mild    Accompanying signs and symptoms: None    History (predisposing factors):  asthma    Precipitating or alleviating factors: None    Therapies tried and outcome:  oral decongestant antihistamine    Problem list and histories reviewed & adjusted, as indicated.  Additional history: as documented    Patient Active Problem List   Diagnosis     Frequent UTI     Hyperlipidemia with target LDL less than 130     Esophageal reflux     Past Surgical History:   Procedure Laterality Date     APPENDECTOMY       reflux bladder surgery       TONSILLECTOMY         Social History   Substance Use Topics     Smoking status: Never Smoker     Smokeless tobacco: Never Used     Alcohol use 1.2 oz/week     2 Standard drinks or equivalent per week      Comment: occasionally 4-6  drinks weekly     Family History   Problem Relation Age of Onset     DIABETES Mother      Hypertension Mother      Breast Cancer Mother 65     stage 1 -- double mastectomy     CANCER Father      renal cancer/bladder cancer     DIABETES Father      diet controlled     Genetic Disorder Sister      mosaic Norton's     Family History Negative Sister      Breast Cancer Maternal Grandmother 48     C.A.D. Maternal Grandfather 62     smoker     Unknown/Adopted Paternal Grandmother      CHF, old age -- 100     C.A.D. Paternal Grandfather 79     smoker     Obesity Brother      possible dyslipidemia         Current Outpatient Prescriptions   Medication Sig Dispense Refill     order for DME Equipment being ordered: Tennis elbow splint 1 Device 0     D-Mannose POWD Take 1 tsp. by mouth daily       albuterol  (PROAIR HFA, PROVENTIL HFA, VENTOLIN HFA) 108 (90 BASE) MCG/ACT inhaler Inhale 2 puffs into the lungs every 4 hours as needed 1 Inhaler 1     Cranberry 450 MG TABS Take  by mouth. daily       Probiotic Product (PROBIOTIC & ACIDOPHILUS EX ST) CAPS Take  by mouth. Daily       multivitamin, therapeutic with minerals (MULTI-VITAMIN) TABS Take 1 tablet by mouth daily       Allergies   Allergen Reactions     Levofloxacin Hives     Levaquin - hives on face     Acetaminophen Hives and Rash     Honey-[place]       Aspirin Hives and Rash     Honey-[place]     Calcium Carbonate Hives and Rash     Honey-[place]     Citric Acid Hives and Rash     Honey-[place]     Potassium Bicarbonate Hives and Rash     Honey-[place]     Sodium Bicarbonate Hives and Rash     Honey-[place]     Labs reviewed in EPIC    ROS:  Constitutional, HEENT, cardiovascular, pulmonary, gi and gu systems are negative, except as otherwise noted.    OBJECTIVE:                                                    /84  Pulse 94  Temp 98.1  F (36.7  C) (Tympanic)  Wt 200 lb 2 oz (90.8 kg)  SpO2 98%  BMI 33.3 kg/m2  Body mass index is 33.3 kg/(m^2).  GENERAL APPEARANCE: alert, mild distress and fatigued  HENT: ear canals and TM's normal and nose and mouth without ulcers or lesions, geographic tongue  NECK: no adenopathy, no asymmetry, masses, or scars and thyroid normal to palpation  RESP: lungs clear to auscultation - no rales, rhonchi or wheezes  CV: regular rates and rhythm, normal S1 S2, no S3 or S4 and no murmur, click or rub  PSYCH: mentation appears normal and affect normal/bright       ASSESSMENT/PLAN:                                                    1. Viral URI    1.  Nasal saline rinse/spray 2-3x/day (brands:  Ocean, Ayr, Homar-Med, etc)  2.  Afrin nasal spray (only use 3 days, not longer!)  1-2x/day  3.  claritin or zrytec daily (always buy cheapest brand)      Patient was agreeable to this plan and had no further questions.  See Patient  Instructions    Jacqui Lee MD  [facility]

## 2018-03-09 NOTE — PATIENT INSTRUCTIONS
1.  Nasal saline rinse/spray 2-3x/day (brands:  Ocean, Ayr, Homar-Med, etc)  2.  Afrin nasal spray (only use 3 days, not longer!)  1-2x/day  3.  claritin or zrytec daily (always buy cheapest brand)

## 2018-03-10 ASSESSMENT — PATIENT HEALTH QUESTIONNAIRE - PHQ9: SUM OF ALL RESPONSES TO PHQ QUESTIONS 1-9: 0

## 2018-03-10 ASSESSMENT — ANXIETY QUESTIONNAIRES: GAD7 TOTAL SCORE: 0

## 2018-03-13 ENCOUNTER — TELEPHONE (OUTPATIENT)
Dept: FAMILY MEDICINE | Facility: OTHER | Age: 43
End: 2018-03-13

## 2018-03-13 NOTE — TELEPHONE ENCOUNTER
1:58 PM    Reason for Call: Phone Call    Description: Patient called in to request a lab for a possible uti can labs be put in for her to be tested.    Was an appointment offered for this call? No  If yes : Appointment type              Date    Preferred method for responding to this message: Telephone Call  What is your phone number ? [phone]    If we cannot reach you directly, may we leave a detailed response at the number you provided? Yes    Can this message wait until your PCP/provider returns, if available today? YES,     Loreto Garibay

## 2018-03-13 NOTE — TELEPHONE ENCOUNTER
Spoke with pt and suggested she make an appointment with anyone that was available and if that was not possible that she should go to urgent care.

## 2018-03-28 ENCOUNTER — MYC MEDICAL ADVICE (OUTPATIENT)
Dept: FAMILY MEDICINE | Facility: OTHER | Age: 43
End: 2018-03-28

## 2018-03-28 NOTE — LETTER
March 29, 2018      Shayy Stratton  [address] [place] [id number]        To Whom It May Concern,      The above pt is under my care for her treatment of Asthma.  Her asthma is currently under control.  She uses her albuterol inhaler as needed for cold weather or exercise.  If there are any further questions regarding her asthma please feel free to contact my office.  Thank you.          Sincerely,        Jacqui Lee MD

## 2018-03-28 NOTE — LETTER
March 29, 2018      Shayy Stratton  [address] [place] [id number]        To Whom It May Concern,      The above pt is under my care for her treatment of Asthma.  The patient only uses her albuterol inhaler as needed.  It is mostly used for cold weather and exercise.  If there are any further questions regarding her asthma please feel free to contact my office at any time.  Thank you for you time.            Sincerely,        Jacqui Lee MD

## 2018-04-20 ENCOUNTER — OFFICE VISIT (OUTPATIENT)
Dept: FAMILY MEDICINE | Facility: OTHER | Age: 43
End: 2018-04-20
Attending: FAMILY MEDICINE
Payer: OTHER GOVERNMENT

## 2018-04-20 VITALS
SYSTOLIC BLOOD PRESSURE: 123 MMHG | TEMPERATURE: 98.7 F | DIASTOLIC BLOOD PRESSURE: 86 MMHG | RESPIRATION RATE: 20 BRPM | OXYGEN SATURATION: 98 % | HEART RATE: 87 BPM | BODY MASS INDEX: 34.16 KG/M2 | WEIGHT: 205 LBS | HEIGHT: 65 IN

## 2018-04-20 DIAGNOSIS — M25.561 CHRONIC PAIN OF BOTH KNEES: ICD-10-CM

## 2018-04-20 DIAGNOSIS — M25.562 CHRONIC PAIN OF BOTH KNEES: ICD-10-CM

## 2018-04-20 DIAGNOSIS — G89.29 CHRONIC PAIN OF BOTH KNEES: ICD-10-CM

## 2018-04-20 DIAGNOSIS — J45.41 MODERATE PERSISTENT ASTHMA WITH EXACERBATION: Primary | ICD-10-CM

## 2018-04-20 PROCEDURE — 99214 OFFICE O/P EST MOD 30 MIN: CPT | Performed by: FAMILY MEDICINE

## 2018-04-20 RX ORDER — PREDNISONE 20 MG/1
20 TABLET ORAL 2 TIMES DAILY
Qty: 10 TABLET | Refills: 0 | Status: SHIPPED | OUTPATIENT
Start: 2018-04-20 | End: 2018-05-01

## 2018-04-20 ASSESSMENT — PAIN SCALES - GENERAL: PAINLEVEL: NO PAIN (0)

## 2018-04-20 ASSESSMENT — ANXIETY QUESTIONNAIRES
1. FEELING NERVOUS, ANXIOUS, OR ON EDGE: NOT AT ALL
7. FEELING AFRAID AS IF SOMETHING AWFUL MIGHT HAPPEN: NOT AT ALL
2. NOT BEING ABLE TO STOP OR CONTROL WORRYING: NOT AT ALL
4. TROUBLE RELAXING: NOT AT ALL
GAD7 TOTAL SCORE: 0
5. BEING SO RESTLESS THAT IT IS HARD TO SIT STILL: NOT AT ALL
6. BECOMING EASILY ANNOYED OR IRRITABLE: NOT AT ALL
3. WORRYING TOO MUCH ABOUT DIFFERENT THINGS: NOT AT ALL

## 2018-04-20 NOTE — NURSING NOTE
"Chief Complaint   Patient presents with     URI       Initial /86 (BP Location: Left arm, Patient Position: Sitting, Cuff Size: Adult Regular)  Pulse 87  Temp 98.7  F (37.1  C) (Temporal)  Resp 20  Ht 5' 5\" (1.651 m)  Wt 205 lb (93 kg)  SpO2 98%  BMI 34.11 kg/m2 Estimated body mass index is 34.11 kg/(m^2) as calculated from the following:    Height as of this encounter: 5' 5\" (1.651 m).    Weight as of this encounter: 205 lb (93 kg).  Medication Reconciliation: complete   Vane Viveros LPN      "

## 2018-04-20 NOTE — PROGRESS NOTES
SUBJECTIVE:   Shayy Stratton is a 42 year old female who presents to clinic today for the following health issues:    RESPIRATORY SYMPTOMS      Duration: 3 weeks    Description  nasal congestion, sore throat, cough, wheezing and emesis due to coughing so hard.    Severity: moderate    Accompanying signs and symptoms: see above    History (predisposing factors):  asthma    Precipitating or alleviating factors: None    Therapies tried and outcome:  rest and fluids oral decongestant acetaminophen OTC NSAID guaifenesin      Musculoskeletal problem/pain      Duration: 3 months    Description  Location: Left greater than right knee pain    Intensity:  moderate    Accompanying signs and symptoms: none    History  Previous similar problem: no   Previous evaluation:  none    Precipitating or alleviating factors:  Trauma or overuse: YES  Aggravating factors include: walking, climbing stairs and overuse    Therapies tried and outcome: acetaminophen    Shayy has had bilateral knee pain over the last several weeks.  Right worse then left.  She recently had an episode of right knee giving way.  The pain karlie the right knee is medial and anterior, while the pain in the left knee is anterior.    Problem list and histories reviewed & adjusted, as indicated.  Additional history: as documented    Patient Active Problem List   Diagnosis     Frequent UTI     Hyperlipidemia with target LDL less than 130     Esophageal reflux     Past Surgical History:   Procedure Laterality Date     APPENDECTOMY       reflux bladder surgery       TONSILLECTOMY         Social History   Substance Use Topics     Smoking status: Never Smoker     Smokeless tobacco: Never Used     Alcohol use 1.2 oz/week     2 Standard drinks or equivalent per week      Comment: occasionally 4-6  drinks weekly     Family History   Problem Relation Age of Onset     DIABETES Mother      Hypertension Mother      Breast Cancer Mother 65     stage 1 -- double mastectomy     CANCER  "Father      renal cancer/bladder cancer     DIABETES Father      diet controlled     Genetic Disorder Sister      mosaic Norton's     Family History Negative Sister      Breast Cancer Maternal Grandmother 48     C.A.D. Maternal Grandfather 62     smoker     Unknown/Adopted Paternal Grandmother      CHF, old age -- 100     C.A.D. Paternal Grandfather 79     smoker     Obesity Brother      possible dyslipidemia         Current Outpatient Prescriptions   Medication Sig Dispense Refill     albuterol (PROAIR HFA, PROVENTIL HFA, VENTOLIN HFA) 108 (90 BASE) MCG/ACT inhaler Inhale 2 puffs into the lungs every 4 hours as needed 1 Inhaler 1     Cranberry 450 MG TABS Take  by mouth. daily       D-Mannose POWD Take 1 tsp. by mouth daily       multivitamin, therapeutic with minerals (MULTI-VITAMIN) TABS Take 1 tablet by mouth daily       order for DME Equipment being ordered: Tennis elbow splint 1 Device 0     Probiotic Product (PROBIOTIC & ACIDOPHILUS EX ST) CAPS Take  by mouth. Daily       Allergies   Allergen Reactions     Levofloxacin Hives     Levaquin - hives on face     Acetaminophen Hives and Rash     Honey-Trail       Aspirin Hives and Rash     Honey-Trail     Calcium Carbonate Hives and Rash     Honey-Trail     Citric Acid Hives and Rash     Honey-Trail     Potassium Bicarbonate Hives and Rash     Honey-Trail     Sodium Bicarbonate Hives and Rash     Honey-Trail       Reviewed and updated as needed this visit by clinical staff  Tobacco  Allergies  Meds  Problems       Reviewed and updated as needed this visit by Provider         ROS:  Constitutional, HEENT, cardiovascular, pulmonary, gi and gu systems are negative, except as otherwise noted.    OBJECTIVE:     /86 (BP Location: Left arm, Patient Position: Sitting, Cuff Size: Adult Regular)  Pulse 87  Temp 98.7  F (37.1  C) (Temporal)  Resp 20  Ht 5' 5\" (1.651 m)  Wt 205 lb (93 kg)  SpO2 98%  BMI 34.11 kg/m2  Body mass index is 34.11 " kg/(m^2).  Physical Exam   Constitutional: She is oriented to person, place, and time. She appears well-developed and well-nourished. No distress.   HENT:   Head: Normocephalic.   Right Ear: Hearing, tympanic membrane, external ear and ear canal normal.   Left Ear: Hearing, tympanic membrane, external ear and ear canal normal.   Nose: Nose normal. Right sinus exhibits no maxillary sinus tenderness and no frontal sinus tenderness. Left sinus exhibits no maxillary sinus tenderness and no frontal sinus tenderness.   Mouth/Throat: Uvula is midline and oropharynx is clear and moist. No oropharyngeal exudate or posterior oropharyngeal erythema.   Eyes: Conjunctivae are normal.   Neck: Neck supple.   Pulmonary/Chest: Effort normal and breath sounds normal.   Musculoskeletal:   Examination of the right knee shows tenderness over the medial aspect and there in involves the joint line.  There is no effusion noted. No patellofemoral crepitus. There is no MCL, LCL, PCL, or ACL laxity.  Patellar apprehension test is negative.  Lachmann's test is negatvie.  Juma's test is negaive..    Examination of the left knee shows tenderness over the medial collateral ligament.  There is no effusion noted. No patellofemoral crepitus. There is no MCL, LCL, PCL, or ACL laxity.  Patellar apprehension test is negative.  Lachmann's test is negative.  Juma's test is negative..     Lymphadenopathy:     She has no cervical adenopathy.   Neurological: She is alert and oriented to person, place, and time.   Skin: Skin is warm and dry.   Psychiatric: She has a normal mood and affect.         Diagnostic Test Results:  none     ASSESSMENT/PLAN:     Moderate persistent asthma with exacerbation  Continue metered dose inhaler.  Begin prednisone twice daily.  Follow up with persistent symptoms.  - predniSONE (DELTASONE) 20 MG tablet; Take 1 tablet (20 mg) by mouth 2 times daily    Chronic pain of both knees  Suspect meniscal disease on the right.   MRI scheduled.  Appointment with Orthopedics  scheduled for evaluation and recommendations for further management.   - MR Knee Right w/o Contrast; Future  - MR Knee Left w/o Contrast; Future  - ORTHOPEDICS ADULT REFERRAL            Ang Betancourt MD  [facility]

## 2018-04-20 NOTE — MR AVS SNAPSHOT
After Visit Summary   4/20/2018    Shayy Stratton    MRN: [id number]           Patient Information     Date Of Birth          1975        Visit Information        Provider Department      4/20/2018 3:00 PM Ang Betancourt MD [facility]        Today's Diagnoses     Moderate persistent asthma with exacerbation    -  1    Chronic pain of both knees          Care Instructions    Radiology will call to schedule MRI bilateral knees.  Appointment with Orthopedics scheduled for evaluation and recommendations for further management. .           Follow-ups after your visit        Additional Services     ORTHOPEDICS ADULT REFERRAL       Your provider has referred you to: [facility] [phone]  [email or web address]    Please be aware that coverage of these services is subject to the terms and limitations of your health insurance plan.  Call member services at your health plan with any benefit or coverage questions.      Please bring the following to your appointment:    >>   Any x-rays, CTs or MRIs which have been performed.  Contact the facility where they were done to arrange for  prior to your scheduled appointment.    >>   List of current medications   >>   This referral request   >>   Any documents/labs given to you for this referral                  Follow-up notes from your care team     Return if symptoms worsen or fail to improve.      Your next 10 appointments already scheduled     Apr 25, 2018  5:45 PM CDT   (Arrive by 5:30 PM)   MR KNEE RIGHT W/O CONTRAST with HIMR1   HI MRI ([facility] )    [address] [place] [id number] [phone]           Take your medicines as usual, unless your doctor tells you not to. Bring a list of your current medicines to your exam (including vitamins, minerals and over-the-counter drugs). Also bring the results of similar scans you may have had.  Please remove  any body piercings and hair extensions before you arrive.  Follow your doctor s orders. If you do not, we may have to postpone your exam.  You may or may not receive IV contrast for this exam pending the discretion of the Radiologist.  You do not need to do anything special to prepare.  The MRI machine uses a strong magnet. Please wear clothes without metal (snaps, zippers). A sweatsuit works well, or we may give you a hospital gown.   **IMPORTANT** THE INSTRUCTIONS BELOW ARE ONLY FOR THOSE PATIENTS WHO HAVE BEEN PRESCRIBED SEDATION OR GENERAL ANESTHESIA DURING THEIR MRI PROCEDURE:  IF YOUR DOCTOR PRESCRIBED ORAL SEDATION (take medicine to help you relax during your exam):   You must get the medicine from your doctor (oral medication) before you arrive. Bring the medicine to the exam. Do not take it at home. You ll be told when to take it upon arriving for your exam.   Arrive one hour early. Bring someone who can take you home after the test. Your medicine will make you sleepy. After the exam, you may not drive, take a bus or take a taxi by yourself.  IF YOUR DOCTOR PRESCRIBED IV SEDATION:   Arrive one hour early. Bring someone who can take you home after the test. Your medicine will make you sleepy. After the exam, you may not drive, take a bus or take a taxi by yourself.   No eating 6 hours before your exam. You may have clear liquids up until 4 hours before your exam. (Clear liquids include water, clear tea, black coffee and fruit juice without pulp.)  IF YOUR DOCTOR PRESCRIBED ANESTHESIA (be asleep for your exam):   Arrive 1 1/2 hours early. Bring someone who can take you home after the test. You may not drive, take a bus or take a taxi by yourself.   No eating 8 hours before your exam. You may have clear liquids up until 4 hours before your exam. (Clear liquids include water, clear tea, black coffee and fruit juice without pulp.)   You will spend four to five hours in the recovery room.  Please call the Imaging  Department at your exam site with any questions.            Apr 25, 2018  6:15 PM CDT   (Arrive by 6:00 PM)   MR KNEE LEFT W/O CONTRAST with HIMR1   HI MRI ([facility] )    [address] [address] [phone]           Take your medicines as usual, unless your doctor tells you not to. Bring a list of your current medicines to your exam (including vitamins, minerals and over-the-counter drugs). Also bring the results of similar scans you may have had.  Please remove any body piercings and hair extensions before you arrive.  Follow your doctor s orders. If you do not, we may have to postpone your exam.  You may or may not receive IV contrast for this exam pending the discretion of the Radiologist.  You do not need to do anything special to prepare.  The MRI machine uses a strong magnet. Please wear clothes without metal (snaps, zippers). A sweatsuit works well, or we may give you a hospital gown.   **IMPORTANT** THE INSTRUCTIONS BELOW ARE ONLY FOR THOSE PATIENTS WHO HAVE BEEN PRESCRIBED SEDATION OR GENERAL ANESTHESIA DURING THEIR MRI PROCEDURE:  IF YOUR DOCTOR PRESCRIBED ORAL SEDATION (take medicine to help you relax during your exam):   You must get the medicine from your doctor (oral medication) before you arrive. Bring the medicine to the exam. Do not take it at home. You ll be told when to take it upon arriving for your exam.   Arrive one hour early. Bring someone who can take you home after the test. Your medicine will make you sleepy. After the exam, you may not drive, take a bus or take a taxi by yourself.  IF YOUR DOCTOR PRESCRIBED IV SEDATION:   Arrive one hour early. Bring someone who can take you home after the test. Your medicine will make you sleepy. After the exam, you may not drive, take a bus or take a taxi by yourself.   No eating 6 hours before your exam. You may have clear liquids up until 4 hours before your exam. (Clear liquids include water, clear tea, black  coffee and fruit juice without pulp.)  IF YOUR DOCTOR PRESCRIBED ANESTHESIA (be asleep for your exam):   Arrive 1 1/2 hours early. Bring someone who can take you home after the test. You may not drive, take a bus or take a taxi by yourself.   No eating 8 hours before your exam. You may have clear liquids up until 4 hours before your exam. (Clear liquids include water, clear tea, black coffee and fruit juice without pulp.)   You will spend four to five hours in the recovery room.  Please call the Imaging Department at your exam site with any questions.              Who to contact     If you have questions or need follow up information about today's clinic visit or your schedule please contact [facility] directly at [phone].  Normal or non-critical lab and imaging results will be communicated to you by MyChart, letter or phone within 4 business days after the clinic has received the results. If you do not hear from us within 7 days, please contact the clinic through [facility] [employer] or phone. If you have a critical or abnormal lab result, we will notify you by phone as soon as possible.  Submit refill requests through [employer] or call your pharmacy and they will forward the refill request to us. Please allow 3 business days for your refill to be completed.          Additional Information About Your Visit        [employer] Information     [employer] gives you secure access to your electronic health record. If you see a primary care provider, you can also send messages to your care team and make appointments. If you have questions, please call your primary care clinic.  If you do not have a primary care provider, please call [phone] and they will assist you.        Care EveryWhere ID     This is your Care EveryWhere ID. This could be used by other organizations to access your [facility] medical records  [id number]        Your Vitals Were     Pulse Temperature Respirations Height Pulse Oximetry BMI (Body Mass  "Index)    87 98.7  F (37.1  C) (Temporal) 20 5' 5\" (1.651 m) 98% 34.11 kg/m2       Blood Pressure from Last 3 Encounters:   04/20/18 123/86   03/09/18 116/84   02/22/18 122/70    Weight from Last 3 Encounters:   04/20/18 205 lb (93 kg)   03/09/18 200 lb 2 oz (90.8 kg)   02/22/18 204 lb 6.4 oz (92.7 kg)              We Performed the Following     ORTHOPEDICS ADULT REFERRAL          Today's Medication Changes          These changes are accurate as of 4/20/18 11:59 PM.  If you have any questions, ask your nurse or doctor.               Start taking these medicines.        Dose/Directions    predniSONE 20 MG tablet   Commonly known as:  DELTASONE   Used for:  Moderate persistent asthma with exacerbation   Started by:  Ang Betancourt MD        Dose:  20 mg   Take 1 tablet (20 mg) by mouth 2 times daily   Quantity:  10 tablet   Refills:  0            Where to get your medicines      These medications were sent to [facility] [address] [facility], [place] - [address] AT [facility] [address], [facility] [address]     Phone:  [phone]     predniSONE 20 MG tablet                Primary Care Provider Office Phone # Fax #    Jacqui MARY Lee [phone]       [facility] [address] [facility] [address]        Equal Access to Services     [facility] AH: Hadii aad ku hadasho Soomaali, waaxda luqadaha, qaybta kaalmada adeegyada, waxay lb davidson ah. So [facility] [phone].    ATENCIÓN: Si habla español, tiene a huizar disposición servicios gratuitos de asistencia lingüística. Llame al [phone].    We comply with applicable federal civil rights laws and [place] laws. We do not discriminate on the basis of race, color, national origin, age, disability, sex, sexual orientation, or gender identity.            Thank you!     Thank you for choosing [facility]  for your care. Our goal is always to provide you with excellent care. Hearing " back from our patients is one way we can continue to improve our services. Please take a few minutes to complete the written survey that you may receive in the mail after your visit with us. Thank you!             Your Updated Medication List - Protect others around you: Learn how to safely use, store and throw away your medicines at www.disposemymeds.org.          This list is accurate as of 4/20/18 11:59 PM.  Always use your most recent med list.                   Brand Name Dispense Instructions for use Diagnosis    albuterol 108 (90 Base) MCG/ACT Inhaler    PROAIR HFA/PROVENTIL HFA/VENTOLIN HFA    1 Inhaler    Inhale 2 puffs into the lungs every 4 hours as needed    Allergic rhinitis, cause unspecified       Cranberry 450 MG Tabs      Take  by mouth. daily        D-Mannose Powd      Take 1 tsp. by mouth daily        Multi-vitamin Tabs tablet      Take 1 tablet by mouth daily        order for DME     1 Device    Equipment being ordered: Tennis elbow splint    Lateral epicondylitis of left elbow       predniSONE 20 MG tablet    DELTASONE    10 tablet    Take 1 tablet (20 mg) by mouth 2 times daily    Moderate persistent asthma with exacerbation       PROBIOTIC & ACIDOPHILUS EX ST Caps      Take  by mouth. Daily

## 2018-04-21 ASSESSMENT — PATIENT HEALTH QUESTIONNAIRE - PHQ9: SUM OF ALL RESPONSES TO PHQ QUESTIONS 1-9: 0

## 2018-04-21 ASSESSMENT — ANXIETY QUESTIONNAIRES: GAD7 TOTAL SCORE: 0

## 2018-04-23 DIAGNOSIS — M25.562 BILATERAL KNEE PAIN: Primary | ICD-10-CM

## 2018-04-23 DIAGNOSIS — M25.561 BILATERAL KNEE PAIN: Primary | ICD-10-CM

## 2018-04-25 ENCOUNTER — HOSPITAL ENCOUNTER (OUTPATIENT)
Dept: MRI IMAGING | Facility: HOSPITAL | Age: 43
Discharge: HOME | End: 2018-04-25
Attending: FAMILY MEDICINE | Admitting: FAMILY MEDICINE
Payer: OTHER GOVERNMENT

## 2018-04-25 ENCOUNTER — HOSPITAL ENCOUNTER (OUTPATIENT)
Dept: MRI IMAGING | Facility: HOSPITAL | Age: 43
End: 2018-04-25
Attending: FAMILY MEDICINE
Payer: OTHER GOVERNMENT

## 2018-04-25 DIAGNOSIS — M25.561 CHRONIC PAIN OF BOTH KNEES: ICD-10-CM

## 2018-04-25 DIAGNOSIS — M25.562 CHRONIC PAIN OF BOTH KNEES: ICD-10-CM

## 2018-04-25 DIAGNOSIS — G89.29 CHRONIC PAIN OF BOTH KNEES: ICD-10-CM

## 2018-04-25 PROCEDURE — 73721 MRI JNT OF LWR EXTRE W/O DYE: CPT | Mod: TC,LT

## 2018-05-01 ENCOUNTER — OFFICE VISIT (OUTPATIENT)
Dept: ORTHOPEDICS | Facility: OTHER | Age: 43
End: 2018-05-01
Attending: FAMILY MEDICINE
Payer: OTHER GOVERNMENT

## 2018-05-01 ENCOUNTER — RADIANT APPOINTMENT (OUTPATIENT)
Dept: GENERAL RADIOLOGY | Facility: OTHER | Age: 43
End: 2018-05-01
Attending: ORTHOPAEDIC SURGERY
Payer: OTHER GOVERNMENT

## 2018-05-01 VITALS
TEMPERATURE: 97.1 F | BODY MASS INDEX: 34.16 KG/M2 | HEIGHT: 65 IN | OXYGEN SATURATION: 97 % | DIASTOLIC BLOOD PRESSURE: 80 MMHG | HEART RATE: 80 BPM | SYSTOLIC BLOOD PRESSURE: 114 MMHG | WEIGHT: 205 LBS

## 2018-05-01 DIAGNOSIS — G89.29 CHRONIC PAIN OF BOTH KNEES: ICD-10-CM

## 2018-05-01 DIAGNOSIS — M25.562 BILATERAL KNEE PAIN: ICD-10-CM

## 2018-05-01 DIAGNOSIS — M25.561 CHRONIC PAIN OF BOTH KNEES: ICD-10-CM

## 2018-05-01 DIAGNOSIS — M25.561 BILATERAL KNEE PAIN: ICD-10-CM

## 2018-05-01 DIAGNOSIS — M25.562 CHRONIC PAIN OF BOTH KNEES: ICD-10-CM

## 2018-05-01 PROCEDURE — 99203 OFFICE O/P NEW LOW 30 MIN: CPT | Performed by: ORTHOPAEDIC SURGERY

## 2018-05-01 PROCEDURE — 73562 X-RAY EXAM OF KNEE 3: CPT | Mod: TC

## 2018-05-01 ASSESSMENT — PAIN SCALES - GENERAL: PAINLEVEL: MILD PAIN (3)

## 2018-05-01 NOTE — NURSING NOTE
"Chief Complaint   Patient presents with     Knee Pain     NPT Bilateral Knee Pain/ Xrays First       Initial /80  Pulse 80  Temp 97.1  F (36.2  C) (Tympanic)  Ht 5' 5\" (1.651 m)  Wt 205 lb (93 kg)  SpO2 97%  BMI 34.11 kg/m2 Estimated body mass index is 34.11 kg/(m^2) as calculated from the following:    Height as of this encounter: 5' 5\" (1.651 m).    Weight as of this encounter: 205 lb (93 kg).  Medication Reconciliation: complete   Emy Moody      "

## 2018-05-01 NOTE — MR AVS SNAPSHOT
"              After Visit Summary   5/1/2018    Shayy Stratton    MRN: [id number]           Patient Information     Date Of Birth          1975        Visit Information        Provider Department      5/1/2018 9:20 AM Kurtis Zaidi, DO  ORTHOPEDICS        Today's Diagnoses     Chronic pain of both knees           Follow-ups after your visit        Who to contact     If you have questions or need follow up information about today's clinic visit or your schedule please contact  ORTHOPEDICS directly at [phone].  Normal or non-critical lab and imaging results will be communicated to you by [employer], letter or phone within 4 business days after the clinic has received the results. If you do not hear from us within 7 days, please contact the clinic through [employer] or phone. If you have a critical or abnormal lab result, we will notify you by phone as soon as possible.  Submit refill requests through [employer] or call your pharmacy and they will forward the refill request to us. Please allow 3 business days for your refill to be completed.          Additional Information About Your Visit        MyChart Information     [employer] gives you secure access to your electronic health record. If you see a primary care provider, you can also send messages to your care team and make appointments. If you have questions, please call your primary care clinic.  If you do not have a primary care provider, please call [phone] and they will assist you.        Care EveryWhere ID     This is your Care EveryWhere ID. This could be used by other organizations to access your [facility] medical records  [id number]        Your Vitals Were     Pulse Temperature Height Pulse Oximetry BMI (Body Mass Index)       80 97.1  F (36.2  C) (Tympanic) 5' 5\" (1.651 m) 97% 34.11 kg/m2        Blood Pressure from Last 3 Encounters:   05/01/18 114/80   04/20/18 123/86   03/09/18 116/84    Weight from Last 3 Encounters:   05/01/18 205 lb (93 kg) "   04/20/18 205 lb (93 kg)   03/09/18 200 lb 2 oz (90.8 kg)              Today, you had the following     No orders found for display         Today's Medication Changes          These changes are accurate as of 5/1/18  9:45 AM.  If you have any questions, ask your nurse or doctor.               Stop taking these medicines if you haven't already. Please contact your care team if you have questions.     order for DME   Stopped by:  Kurtis Zaidi DO           predniSONE 20 MG tablet   Commonly known as:  DELTASONE   Stopped by:  Kurtis Zaidi DO                    Primary Care Provider Office Phone # Fax #    Jacqui MARY Lee [phone]       [facility] [place] [address]  [place] [address]        Equal Access to Services     [facility] MARINELLI : Hadii glenny santizoo Soomaali, waaxda luqadaha, qaybta kaalmada adeegyada, lea davidson . So [facility] [phone].    ATENCIÓN: Si habla español, tiene a huizar disposición servicios gratuitos de asistencia lingüística. Llame al [phone].    We comply with applicable federal civil rights laws and [place] laws. We do not discriminate on the basis of race, color, national origin, age, disability, sex, sexual orientation, or gender identity.            Thank you!     Thank you for choosing  ORTHOPEDICS  for your care. Our goal is always to provide you with excellent care. Hearing back from our patients is one way we can continue to improve our services. Please take a few minutes to complete the written survey that you may receive in the mail after your visit with us. Thank you!             Your Updated Medication List - Protect others around you: Learn how to safely use, store and throw away your medicines at www.disposemymeds.org.          This list is accurate as of 5/1/18  9:45 AM.  Always use your most recent med list.                   Brand Name Dispense Instructions for use Diagnosis    albuterol 108 (90 Base)  MCG/ACT Inhaler    PROAIR HFA/PROVENTIL HFA/VENTOLIN HFA    1 Inhaler    Inhale 2 puffs into the lungs every 4 hours as needed    Allergic rhinitis, cause unspecified       Cranberry 450 MG Tabs      Take  by mouth. daily        D-Mannose Powd      Take 1 tsp. by mouth daily        Multi-vitamin Tabs tablet      Take 1 tablet by mouth daily        PROBIOTIC & ACIDOPHILUS EX ST Caps      Take  by mouth. Daily

## 2018-05-01 NOTE — PROGRESS NOTES
Patient is a 42-year-old female with chief complaint of bilateral knee pain.  The right knee hurts more than the left, but both knees are bothered by irregular ground, climbing hills, she is given a jogging and now exercises by riding a bike and walking.  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  She would like to continue to do this work for a few more years at least.  When she was in her teens, she went through a period of bracing and exercises to strengthen her VMO all, most likely for patellar instability.    Past medical history: Positive for occasional allergic reactive airway disease, her medications are reviewed, she takes no medications other than vitamins and supplements.    Allergies: Level floxacillin, acetaminophen, aspirin, calcium carbonate, citric acid, potassium bicarbonate, sodium bicarbonate.    Review of systems: General, HEENT, respiratory, cardiovascular, GI, neurologic, endocrine, all normal.  : Frequent urinary tract infections.    Physical exam: Patient is an alert, healthy-appearing female in no apparent distress.    HEENT: Wears corrective lenses, otherwise cranial nerves II through XII intact, full range of motion of cervical spine, midline trachea    Chest: Normal symmetry and excursion    Abdomen: Appears normal    Cardiovascular: Brisk peripheral pulses, normal vital signs, regular rate and rhythm.    Neurologic: Normal gait, normal coordination.    Extremities: Both lower extremities are examined.  The patient has normal-appearing musculature, normal skin tone, normal alignment of the lower extremities.  Right knee: Mild effusion, tenderness over the medial joint line, tenderness with compression and translation of the patella.  The knee is ligamentously stable within normal range of motion.  Left knee: No effusion, no joint line tenderness, positive  "tenderness with patella trans-location and compression.  These ligamentously stable.    Ancillary studies: X-rays demonstrate medial and patellofemoral joint arthritis in the right knee, possible intra-articular loose bodies, patella is high riding and lateralized with a low lateral trochlear wall.  X-rays of the left knee demonstrate patellofemoral joint arthrosis with a high riding and lateralized patella, medial and lateral joint spaces are well preserved.  The MRI demonstrates loose bodies in the diffusion in the right knee, with advanced degenerative changes in the patellofemoral joint mild to moderate in the medial compartment.  MRI of the left knee demonstrates intact articular cartilage and intra-articular structures, with degenerative changes in the patellofemoral joint.    Assessment and plan: The patient is not ready at the present time to consider any surgical intervention, she will continue with anti-inflammatory medication, consider alternate exercises, walking and biking are very good, she should avoid heavy loading of the patellofemoral joint.  She did find an anti-inflammatory medication that is compatible with intermittent use when her symptoms flare up, she continue weight loss and maintaining fitness.  We discussed treatment options for osteoarthritis as her condition worsen such as indications for surgical intervention, injections etc.  She'll follow up on an as-needed basis./80  Pulse 80  Temp 97.1  F (36.2  C) (Tympanic)  Ht 5' 5\" (1.651 m)  Wt 205 lb (93 kg)  SpO2 97%  BMI 34.11 kg/m2  "

## 2018-06-07 ENCOUNTER — HOSPITAL ENCOUNTER (EMERGENCY)
Facility: OTHER | Age: 43
Discharge: HOME | End: 2018-06-07
Attending: FAMILY MEDICINE | Admitting: FAMILY MEDICINE
Payer: OTHER GOVERNMENT

## 2018-06-07 ENCOUNTER — TELEPHONE (OUTPATIENT)
Dept: FAMILY MEDICINE | Facility: OTHER | Age: 43
End: 2018-06-07

## 2018-06-07 VITALS
HEIGHT: 65 IN | TEMPERATURE: 98.2 F | RESPIRATION RATE: 18 BRPM | WEIGHT: 190 LBS | BODY MASS INDEX: 31.65 KG/M2 | OXYGEN SATURATION: 97 % | HEART RATE: 93 BPM | SYSTOLIC BLOOD PRESSURE: 119 MMHG | DIASTOLIC BLOOD PRESSURE: 81 MMHG

## 2018-06-07 DIAGNOSIS — R19.7 DIARRHEA OF PRESUMED INFECTIOUS ORIGIN: ICD-10-CM

## 2018-06-07 LAB
ALBUMIN UR-MCNC: NEGATIVE MG/DL
APPEARANCE UR: CLEAR
BILIRUB UR QL STRIP: NEGATIVE
C DIFF TOX B STL QL: NEGATIVE
COLOR UR AUTO: YELLOW
GLUCOSE UR STRIP-MCNC: NEGATIVE MG/DL
HGB UR QL STRIP: NEGATIVE
KETONES UR STRIP-MCNC: NEGATIVE MG/DL
LEUKOCYTE ESTERASE UR QL STRIP: NEGATIVE
NITRATE UR QL: NEGATIVE
PH UR STRIP: 5.5 PH (ref 5–7)
SOURCE: NORMAL
SP GR UR STRIP: 1.01 (ref 1–1.03)
SPECIMEN SOURCE: NORMAL
UROBILINOGEN UR STRIP-ACNC: 0.2 EU/DL (ref 0.2–1)

## 2018-06-07 PROCEDURE — 99283 EMERGENCY DEPT VISIT LOW MDM: CPT | Mod: Z6 | Performed by: FAMILY MEDICINE

## 2018-06-07 PROCEDURE — 87046 STOOL CULTR AEROBIC BACT EA: CPT | Performed by: FAMILY MEDICINE

## 2018-06-07 PROCEDURE — 87899 AGENT NOS ASSAY W/OPTIC: CPT | Performed by: FAMILY MEDICINE

## 2018-06-07 PROCEDURE — 87045 FECES CULTURE AEROBIC BACT: CPT | Performed by: FAMILY MEDICINE

## 2018-06-07 PROCEDURE — 81003 URINALYSIS AUTO W/O SCOPE: CPT | Performed by: FAMILY MEDICINE

## 2018-06-07 PROCEDURE — 87493 C DIFF AMPLIFIED PROBE: CPT | Performed by: FAMILY MEDICINE

## 2018-06-07 PROCEDURE — 99283 EMERGENCY DEPT VISIT LOW MDM: CPT | Performed by: FAMILY MEDICINE

## 2018-06-07 ASSESSMENT — ENCOUNTER SYMPTOMS
RESPIRATORY NEGATIVE: 1
VOMITING: 0
MUSCULOSKELETAL NEGATIVE: 1
CARDIOVASCULAR NEGATIVE: 1
DIAPHORESIS: 0
ABDOMINAL DISTENTION: 0
ANAL BLEEDING: 0
DIARRHEA: 1
EYES NEGATIVE: 1
FATIGUE: 1
ABDOMINAL PAIN: 1
CONSTIPATION: 0
FEVER: 0
FREQUENCY: 0
BLOOD IN STOOL: 0
NAUSEA: 0
DYSURIA: 0
CHILLS: 0

## 2018-06-07 NOTE — TELEPHONE ENCOUNTER
9:06 AM    Reason for Call: Phone Call    Description: Pt called and states that she has been having stomach cramps and diharriah since Monday and would like to see if she should come in or not.    Was an appointment offered for this call? No  If yes : Appointment type              Date    Preferred method for responding to this message: Telephone Call  What is your phone number ?[phone]    If we cannot reach you directly, may we leave a detailed response at the number you provided? Yes    Can this message wait until your PCP/provider returns, if available today? Not applicable, [facility]     [facility]

## 2018-06-07 NOTE — DISCHARGE INSTRUCTIONS
"   * FOOD POISONING or VIRAL GASTROENTERITIS (6yr-Adult)  FOOD POISONING may occur from 6 to 24 hours after eating food that has spoiled and lasts up to1-2 days. VIRAL GASTRO-ENTERITIS is commonly known as the \"stomach flu\" and may last 2-7 days. Symptoms of both illnesses may include vomiting, diarrhea, fever, abdominal cramping. Antibiotics are not effective, but simple home treatment will be helpful.  HOME CARE:      If symptoms are severe, rest at home for the next 24 hours.    Avoid tobacco and alcohol. These may worsen your symptoms.    If medicines for diarrhea (low dose of Immodium: one tablet a day for an adult) or vomiting were prescribed, take only as directed.   During the first 12 to 24 hours follow the diet below:    DRINKS: Sport drinks like Gatorade, soft drinks without caffeine; ginger ale, mineral water (plain or flavored), decaffeinated tea and coffee.    SOUPS: Clear broth, consommé and bouillon    DESSERTS: Plain gelatin (Jell-O), popsicles and fruit juice bars.  During the next 24 hours you may add the following to the above:    Hot cereal, plain toast, bread, rolls, crackers    Plain noodles, rice, mashed potatoes, chicken noodle or rice soup    Unsweetened canned fruit (avoid pineapple), bananas    Limit fat intake to less than 15 grams per day by avoiding margarine, butter, oils, mayonnaise, sauces, gravies, fried foods, peanut butter, meat, poultry and fish.    Limit fiber; avoid raw or cooked vegetables, fresh fruits (except bananas) and bran cereals.    Limit caffeine and chocolate. No spices or seasonings except salt.  Slowly go back to a normal diet as you feel better and your symptoms lessen.  FOLLOW UP with your doctor as advised if you are not better in 2 days. If a stool (diarrhea) sample was taken, you may call in 2 days (or as directed) for the results.  GET PROMPT MEDICAL ATTENTION if any of the following occur:    Increasing abdominal pain or constant pain in one " spot    Continued vomiting (unable to keep liquids down)    Frequent diarrhea (more than 5 times a day)    Blood in vomit or stool (black or red color)    Unable to take in fluids at all    No urine output for 12 hours or extreme thirst    Weakness, dizziness, fainting    Drowsiness, confusion, stiff neck or seizure    Fever over 101.0  F (38.3  C) for more than 3 days    New rash    5306-1268 [employer]. [address] [place] [address], [place] [address]. All rights reserved. This information is not intended as a substitute for professional medical care. Always follow your healthcare professional's instructions.  This information has been modified by your health care provider with permission from the publisher.

## 2018-06-07 NOTE — ED PROVIDER NOTES
History   No chief complaint on file.    HPI  Shayy Stratton is a 43 year old female who is in town for event and after lunch (of left over pork chop) Sunday started having midepigastric pain/cramping and diarrhea.  Her pain is similar to past gastritis/reflux.  She has been having about 6 diarrhea stools per day since but she feels she is able to keep up with oral fluids.  No f/c/s.  No bloody diarrhea.  Last abx about 2 months ago for UTI.  No hx of c.diff.  No travel.  No contagious contacts.  Uncertain if she had bad food exposure.    Problem List:    Patient Active Problem List    Diagnosis Date Noted     Hyperlipidemia with target LDL less than 130 03/04/2015     Priority: Medium     Diagnosis updated by automated process. Provider to review and confirm.       Esophageal reflux 03/04/2015     Priority: Medium     Frequent UTI 04/08/2013     Priority: Medium        Past Medical History:    Past Medical History:   Diagnosis Date     Asthma 8/31/2012     Chronic/recurrent back pain 2/22/2008     Dyslipidemia 2/26/2007     Frequent UTI      Geographic tongue      GERD (gastroesophageal reflux disease)      Obesity        Past Surgical History:    Past Surgical History:   Procedure Laterality Date     APPENDECTOMY       reflux bladder surgery       TONSILLECTOMY         Family History:    Family History   Problem Relation Age of Onset     DIABETES Mother      Hypertension Mother      Breast Cancer Mother 65     stage 1 -- double mastectomy     CANCER Father      renal cancer/bladder cancer     DIABETES Father      diet controlled     Genetic Disorder Sister      mosaic Norton's     Family History Negative Sister      Breast Cancer Maternal Grandmother 48     C.A.D. Maternal Grandfather 62     smoker     Unknown/Adopted Paternal Grandmother      CHF, old age -- 100     C.A.D. Paternal Grandfather 79     smoker     Obesity Brother      possible dyslipidemia       Social History:  Marital Status:  Single [1]  Social  "History   Substance Use Topics     Smoking status: Never Smoker     Smokeless tobacco: Never Used     Alcohol use 1.2 oz/week     2 Standard drinks or equivalent per week      Comment: occasionally 4-6  drinks weekly        Medications:      albuterol (PROAIR HFA, PROVENTIL HFA, VENTOLIN HFA) 108 (90 BASE) MCG/ACT inhaler   Cranberry 450 MG TABS   D-Mannose POWD   multivitamin, therapeutic with minerals (MULTI-VITAMIN) TABS   Probiotic Product (PROBIOTIC & ACIDOPHILUS EX ST) CAPS         Review of Systems   Constitutional: Positive for fatigue. Negative for chills, diaphoresis and fever.   HENT: Negative.    Eyes: Negative.    Respiratory: Negative.    Cardiovascular: Negative.    Gastrointestinal: Positive for abdominal pain and diarrhea. Negative for abdominal distention, anal bleeding, blood in stool, constipation, nausea and vomiting.   Genitourinary: Negative.  Negative for dysuria, frequency and urgency.   Musculoskeletal: Negative.    Skin: Negative.  Negative for rash.       Physical Exam   BP: 122/83  Pulse: 85  Temp: 98.2  F (36.8  C)  Resp: 18  Height: 165.1 cm (5' 5\")  Weight: 86.2 kg (190 lb)  SpO2: 98 %      Physical Exam   Constitutional: She appears well-developed and well-nourished. No distress.   Currently fairly comfortable appearing mid-aged female.     HENT:   Head: Normocephalic and atraumatic.   Right Ear: External ear normal.   Left Ear: External ear normal.   Nose: Nose normal.   Mouth/Throat: Oropharynx is clear and moist.   Eyes: Conjunctivae and EOM are normal. Pupils are equal, round, and reactive to light. Right eye exhibits no discharge. Left eye exhibits no discharge. No scleral icterus.   Neck: Normal range of motion. Neck supple. No tracheal deviation present. No thyromegaly present.   Cardiovascular: Normal rate, regular rhythm and normal heart sounds.    No murmur heard.  Pulmonary/Chest: Effort normal and breath sounds normal. No respiratory distress.   Abdominal: Soft. Bowel " sounds are normal. She exhibits no distension. There is tenderness (mild midepigastric/subxyphoid tenderness but no RUQ or LUQ tenderness - negative Moran's sign.  mild LLQ tenderness.). There is no rebound and no guarding.   Musculoskeletal: Normal range of motion. She exhibits no edema.   Lymphadenopathy:     She has no cervical adenopathy.   Neurological: She is alert. She exhibits normal muscle tone.   Skin: Skin is warm and dry. No rash noted. She is not diaphoretic.   Psychiatric: She has a normal mood and affect.   Nursing note and vitals reviewed.      ED Course     ED Course     Procedures               Critical Care time:  none               Results for orders placed or performed during the hospital encounter of 06/07/18 (from the past 24 hour(s))   *UA reflex to Microscopic   Result Value Ref [facility]    Color Urine Yellow     Appearance Urine Clear     Glucose Urine Negative NEG^Negative mg/dL    Bilirubin Urine Negative NEG^Negative    Ketones Urine Negative NEG^Negative mg/dL    Specific Gravity Urine 1.010 1.003 - 1.035    Blood Urine Negative NEG^Negative    pH Urine 5.5 5.0 - 7.0 pH    Protein Albumin Urine Negative NEG^Negative mg/dL    Urobilinogen Urine 0.2 0.2 - 1.0 EU/dL    Nitrite Urine Negative NEG^Negative    Leukocyte Esterase Urine Negative NEG^Negative    Source Midstream Urine    Clostridium difficile toxin B PCR   Result Value Ref [facility]    Specimen Description Feces     C Diff Toxin B PCR Negative NEG^Negative       Medications - No data to display    5:37 PM patient in BR trying for stool sample.    Assessments & Plan (with Medical Decision Making)   43 year old female with onset of diarrhea and midepigastric cramping pain/reflux after eating leftovers Sunday.  No fever or bloody diarrhea.  Patient has fairly benign exam and appears to be improving today with less pain than initially.  Patient is reassured that most diarrheal illnesses are self limited and resolve within a week without  treatment.  She does give stool for c.diff and culture but feels well enough to keep up with fluids and she will d/c home and f/u in the next 1-4 days for recheck in clinic if not better.  Or f/u in ED for fever or bloody diarrhea.    I have reviewed the nursing notes.    I have reviewed the findings, diagnosis, plan and need for follow up with the patient.       Discharge Medication List as of 6/7/2018  6:44 PM          Final diagnoses:   Diarrhea of presumed infectious origin       6/7/2018   [facility]  06/07/18 5554

## 2018-06-07 NOTE — ED AVS SNAPSHOT
"     [address]    [place] [address]    Phone:  [phone]    Fax:  [phone]                                       Shayy Stratton   MRN: [id number]    Department:     Date of Visit:  6/7/2018           Patient Information     Date Of Birth          1975        Your diagnoses for this visit were:     Diarrhea of presumed infectious origin        You were seen by Everett Salas MD.      Follow-up Information     Follow up with Jacqui Lee MD. Schedule an appointment as soon as possible for a visit in 4 days.    Specialty:  Family Practice    Contact information:    [facility]  [address]  [place] [id number] [phone]          Discharge Instructions          * FOOD POISONING or VIRAL GASTROENTERITIS (6yr-Adult)  FOOD POISONING may occur from 6 to 24 hours after eating food that has spoiled and lasts up to1-2 days. VIRAL GASTRO-ENTERITIS is commonly known as the \"stomach flu\" and may last 2-7 days. Symptoms of both illnesses may include vomiting, diarrhea, fever, abdominal cramping. Antibiotics are not effective, but simple home treatment will be helpful.  HOME CARE:      If symptoms are severe, rest at home for the next 24 hours.    Avoid tobacco and alcohol. These may worsen your symptoms.    If medicines for diarrhea (low dose of Immodium: one tablet a day for an adult) or vomiting were prescribed, take only as directed.   During the first 12 to 24 hours follow the diet below:    DRINKS: Sport drinks like Gatorade, soft drinks without caffeine; ginger ale, mineral water (plain or flavored), decaffeinated tea and coffee.    SOUPS: Clear broth, consommé and bouillon    DESSERTS: Plain gelatin (Jell-O), popsicles and fruit juice bars.  During the next 24 hours you may add the following to the above:    Hot cereal, plain toast, bread, rolls, crackers    Plain noodles, rice, mashed potatoes, " chicken noodle or rice soup    Unsweetened canned fruit (avoid pineapple), bananas    Limit fat intake to less than 15 grams per day by avoiding margarine, butter, oils, mayonnaise, sauces, gravies, fried foods, peanut butter, meat, poultry and fish.    Limit fiber; avoid raw or cooked vegetables, fresh fruits (except bananas) and bran cereals.    Limit caffeine and chocolate. No spices or seasonings except salt.  Slowly go back to a normal diet as you feel better and your symptoms lessen.  FOLLOW UP with your doctor as advised if you are not better in 2 days. If a stool (diarrhea) sample was taken, you may call in 2 days (or as directed) for the results.  GET PROMPT MEDICAL ATTENTION if any of the following occur:    Increasing abdominal pain or constant pain in one spot    Continued vomiting (unable to keep liquids down)    Frequent diarrhea (more than 5 times a day)    Blood in vomit or stool (black or red color)    Unable to take in fluids at all    No urine output for 12 hours or extreme thirst    Weakness, dizziness, fainting    Drowsiness, confusion, stiff neck or seizure    Fever over 101.0  F (38.3  C) for more than 3 days    New rash    8629-5227 [employer]. [address] [place] [address], [place] [address]. All rights reserved. This information is not intended as a substitute for professional medical care. Always follow your healthcare professional's instructions.  This information has been modified by your health care provider with permission from the publisher.      24 Hour Appointment Hotline       To make an appointment at any [facility], call [phone] ([phone]). If you don't have a family doctor or clinic, we will help you find one. [facility] clinics are conveniently located to serve the needs of you and your family.             Review of your medicines      Our records show that you are taking the medicines listed below. If these are incorrect, please call your family doctor  or clinic.        Dose / Directions Last dose taken    albuterol 108 (90 Base) MCG/ACT Inhaler   Commonly known as:  PROAIR HFA/PROVENTIL HFA/VENTOLIN HFA   Dose:  2 puff   Quantity:  1 Inhaler        Inhale 2 puffs into the lungs every 4 hours as needed   Refills:  1        Cranberry 450 MG Tabs        Take  by mouth. daily   Refills:  0        D-Mannose Powd   Dose:  1 tsp.        Take 1 tsp. by mouth daily   Refills:  0        Multi-vitamin Tabs tablet   Dose:  1 tablet        Take 1 tablet by mouth daily   Refills:  0        PROBIOTIC & ACIDOPHILUS EX ST Caps        Take  by mouth. Daily   Refills:  0                Procedures and tests performed during your visit     *UA reflex to Microscopic    Clostridium difficile toxin B PCR    Stool culture SSCE      Orders Needing Specimen Collection     None      Pending Results     Date and Time Order Name Status Description    6/7/2018 1824 Clostridium difficile toxin B PCR In process     6/7/2018 1824 Stool culture SSCE In process             Pending Culture Results     Date and Time Order Name Status Description    6/7/2018 1824 Clostridium difficile toxin B PCR In process     6/7/2018 1824 Stool culture SSCE In process             Pending Results Instructions     If you had any lab results that were not finalized at the time of your Discharge, you can call the ED Lab Result RN at [phone]. You will be contacted by this team for any positive Lab results or changes in treatment. The nurses are available 7 days a week from 10A to 6:30P.  You can leave a message 24 hours per day and they will return your call.        Thank you for choosing [facility]       Thank you for choosing [facility] for your care. Our goal is always to provide you with excellent care. Hearing back from our patients is one way we can continue to improve our services. Please take a few minutes to complete the written survey that you may receive in the mail after you visit with us. Thank you!         [facility] Information     [facility] gives you secure access to your electronic health record. If you see a primary care provider, you can also send messages to your care team and make appointments. If you have questions, please call your primary care clinic.  If you do not have a primary care provider, please call [phone] and they will assist you.        Care EveryWhere ID     This is your Care EveryWhere ID. This could be used by other organizations to access your [facility] medical records  [id number]        Equal Access to Services     BRANDI MARINELLI : Hadii aad ku hadasho Soomaali, waaxda luqadaha, qaybta kaalmada adekyara, lea hobbs. So [facility] [phone].    ATENCIÓN: Si habla español, tiene a huizar disposición servicios gratuitos de asistencia lingüística. Llame al [phone].    We comply with applicable federal civil rights laws and [place] laws. We do not discriminate on the basis of race, color, national origin, age, disability, sex, sexual orientation, or gender identity.            After Visit Summary       This is your record. Keep this with you and show to your community pharmacist(s) and doctor(s) at your next visit.

## 2018-06-07 NOTE — TELEPHONE ENCOUNTER
Spoke with pt and she is out of town.  Unable to make an appointment today, but could tomorrow.  We are not in tomorrow.  Recommended contacting [facility] since we are affiliated and they would be able to see her records.  Pt states that she will call there and make appointment.

## 2018-06-07 NOTE — ED AVS SNAPSHOT
[facility]    [address]    [place] [address]    Phone:  [phone]    Fax:  [phone]                                       Shayy Stratton   MRN: [id number]    Department:  [facility]   Date of Visit:  6/7/2018           After Visit Summary Signature Page     I have received my discharge instructions, and my questions have been answered. I have discussed any challenges I see with this plan with the nurse or doctor.    ..........................................................................................................................................  Patient/Patient Representative Signature      ..........................................................................................................................................  Patient Representative Print Name and Relationship to Patient    ..................................................               ................................................  Date                                            Time    ..........................................................................................................................................  Reviewed by Signature/Title    ...................................................              ..............................................  Date                                                            Time

## 2018-06-07 NOTE — ED TRIAGE NOTES
Pt reports abdominal pain since Sunday, her whole body hurt.  Monday she woke up with sharp pains in her upper abdomen, pain is intermittent, she has heartburn & diarrhea.  She took zantac & pepto bismol, mylanta the other day and she also switched to pepcid, pains are dull now but still there and she continues to have diarrhea, denies nausea.  Pt reports similar sx just before Memorial day that lasted 2 days and she got better now it's happening again but she has sharp pains in her abdomen.

## 2018-06-09 LAB
BACTERIA SPEC CULT: NORMAL
E COLI SXT1+2 STL IA: NORMAL
E COLI SXT1+2 STL IA: NORMAL
SPECIMEN SOURCE: NORMAL

## 2018-06-11 ENCOUNTER — RADIANT APPOINTMENT (OUTPATIENT)
Dept: GENERAL RADIOLOGY | Facility: OTHER | Age: 43
End: 2018-06-11
Attending: FAMILY MEDICINE
Payer: OTHER GOVERNMENT

## 2018-06-11 ENCOUNTER — TELEPHONE (OUTPATIENT)
Dept: FAMILY MEDICINE | Facility: OTHER | Age: 43
End: 2018-06-11

## 2018-06-11 ENCOUNTER — OFFICE VISIT (OUTPATIENT)
Dept: FAMILY MEDICINE | Facility: OTHER | Age: 43
End: 2018-06-11
Attending: FAMILY MEDICINE
Payer: OTHER GOVERNMENT

## 2018-06-11 VITALS
WEIGHT: 197 LBS | BODY MASS INDEX: 32.78 KG/M2 | HEART RATE: 92 BPM | OXYGEN SATURATION: 95 % | TEMPERATURE: 98 F | SYSTOLIC BLOOD PRESSURE: 116 MMHG | DIASTOLIC BLOOD PRESSURE: 82 MMHG

## 2018-06-11 DIAGNOSIS — K21.9 GASTROESOPHAGEAL REFLUX DISEASE, ESOPHAGITIS PRESENCE NOT SPECIFIED: ICD-10-CM

## 2018-06-11 DIAGNOSIS — R10.84 ABDOMINAL PAIN, GENERALIZED: ICD-10-CM

## 2018-06-11 DIAGNOSIS — R10.84 ABDOMINAL PAIN, GENERALIZED: Primary | ICD-10-CM

## 2018-06-11 DIAGNOSIS — K59.01 SLOW TRANSIT CONSTIPATION: ICD-10-CM

## 2018-06-11 DIAGNOSIS — K59.1 FUNCTIONAL DIARRHEA: ICD-10-CM

## 2018-06-11 LAB
BASOPHILS # BLD AUTO: 0 10E9/L (ref 0–0.2)
BASOPHILS NFR BLD AUTO: 0.2 %
DIFFERENTIAL METHOD BLD: NORMAL
EOSINOPHIL # BLD AUTO: 0 10E9/L (ref 0–0.7)
EOSINOPHIL NFR BLD AUTO: 0.9 %
ERYTHROCYTE [DISTWIDTH] IN BLOOD BY AUTOMATED COUNT: 12.2 % (ref 10–15)
ERYTHROCYTE [SEDIMENTATION RATE] IN BLOOD BY WESTERGREN METHOD: 11 MM/H (ref 0–20)
HCT VFR BLD AUTO: 39.8 % (ref 35–47)
HGB BLD-MCNC: 13.7 G/DL (ref 11.7–15.7)
IMM GRANULOCYTES # BLD: 0 10E9/L (ref 0–0.4)
IMM GRANULOCYTES NFR BLD: 0.5 %
LYMPHOCYTES # BLD AUTO: 1.4 10E9/L (ref 0.8–5.3)
LYMPHOCYTES NFR BLD AUTO: 32.1 %
MCH RBC QN AUTO: 30.3 PG (ref 26.5–33)
MCHC RBC AUTO-ENTMCNC: 34.4 G/DL (ref 31.5–36.5)
MCV RBC AUTO: 88 FL (ref 78–100)
MONOCYTES # BLD AUTO: 0.5 10E9/L (ref 0–1.3)
MONOCYTES NFR BLD AUTO: 11.2 %
NEUTROPHILS # BLD AUTO: 2.4 10E9/L (ref 1.6–8.3)
NEUTROPHILS NFR BLD AUTO: 55.1 %
NRBC # BLD AUTO: 0 10*3/UL
NRBC BLD AUTO-RTO: 0 /100
PLATELET # BLD AUTO: 316 10E9/L (ref 150–450)
RBC # BLD AUTO: 4.52 10E12/L (ref 3.8–5.2)
WBC # BLD AUTO: 4.4 10E9/L (ref 4–11)

## 2018-06-11 PROCEDURE — 36415 COLL VENOUS BLD VENIPUNCTURE: CPT | Performed by: FAMILY MEDICINE

## 2018-06-11 PROCEDURE — 86256 FLUORESCENT ANTIBODY TITER: CPT | Mod: 90 | Performed by: FAMILY MEDICINE

## 2018-06-11 PROCEDURE — 99000 SPECIMEN HANDLING OFFICE-LAB: CPT | Performed by: FAMILY MEDICINE

## 2018-06-11 PROCEDURE — 74019 RADEX ABDOMEN 2 VIEWS: CPT | Mod: TC

## 2018-06-11 PROCEDURE — 83516 IMMUNOASSAY NONANTIBODY: CPT | Mod: 59 | Performed by: FAMILY MEDICINE

## 2018-06-11 PROCEDURE — 85025 COMPLETE CBC W/AUTO DIFF WBC: CPT | Performed by: FAMILY MEDICINE

## 2018-06-11 PROCEDURE — 85652 RBC SED RATE AUTOMATED: CPT | Performed by: FAMILY MEDICINE

## 2018-06-11 PROCEDURE — 99214 OFFICE O/P EST MOD 30 MIN: CPT | Performed by: FAMILY MEDICINE

## 2018-06-11 PROCEDURE — 83516 IMMUNOASSAY NONANTIBODY: CPT | Mod: 90 | Performed by: FAMILY MEDICINE

## 2018-06-11 RX ORDER — PANTOPRAZOLE SODIUM 40 MG/1
40 TABLET, DELAYED RELEASE ORAL DAILY
Qty: 90 TABLET | Refills: 0 | Status: SHIPPED | OUTPATIENT
Start: 2018-06-11 | End: 2019-03-27 | Stop reason: DRUGHIGH

## 2018-06-11 RX ORDER — PANTOPRAZOLE SODIUM 40 MG/1
40 TABLET, DELAYED RELEASE ORAL DAILY
Qty: 90 TABLET | Refills: 0 | Status: SHIPPED | OUTPATIENT
Start: 2018-06-11 | End: 2018-06-11

## 2018-06-11 ASSESSMENT — ANXIETY QUESTIONNAIRES
4. TROUBLE RELAXING: NOT AT ALL
7. FEELING AFRAID AS IF SOMETHING AWFUL MIGHT HAPPEN: NOT AT ALL
3. WORRYING TOO MUCH ABOUT DIFFERENT THINGS: NOT AT ALL
6. BECOMING EASILY ANNOYED OR IRRITABLE: NOT AT ALL
1. FEELING NERVOUS, ANXIOUS, OR ON EDGE: NOT AT ALL
5. BEING SO RESTLESS THAT IT IS HARD TO SIT STILL: NOT AT ALL
2. NOT BEING ABLE TO STOP OR CONTROL WORRYING: NOT AT ALL
GAD7 TOTAL SCORE: 0

## 2018-06-11 ASSESSMENT — PAIN SCALES - GENERAL: PAINLEVEL: NO PAIN (0)

## 2018-06-11 NOTE — TELEPHONE ENCOUNTER
Reason for call:  Medication    1. Medication Name? Protonix  2. Is this request for a refill? No  3. What Pharmacy do you use? [facility] in [place]  4. Have you contacted your pharmacy? No    5. If yes, when?  (Please note that the turn-around-time for prescriptions is 72 business hours; I am sending your request at this time. SEND TO  [facility]  )  Description: Pt states at her appointment today with Dr. Lee, that there was going to be a prescription for Protonix  Sent to the [facility]. Pt states that since the pharmacy hasn't received it yet, she would like to sent to [facility] in [place] because she's on her way home (lives in [place]).   Was an appointment offered for this a call? No   Preferred method for responding to this messageTelephone Call [phone]  If we cannot reach you directly, may we leave a detailed response at the number you provided? Yes  Can this message wait until your PCP/Provider returns if not available today? N/A provider is in today

## 2018-06-11 NOTE — PROGRESS NOTES
SUBJECTIVE:                                                    Shayy Stratton is a 43 year old female who presents to clinic today for the following health issues:      Abdominal Pain      Duration: a couple weeks off and on    Description (location/character/radiation): upper abdomen       Associated flank pain: None    Intensity:  moderate    Accompanying signs and symptoms:        Fever/Chills: no        Gas/Bloating: YES       Nausea/vomitting: no        Diarrhea: YES       Dysuria or Hematuria: no     History (previous similar pain/trauma/previous testing): none    Precipitating or alleviating factors:       Pain worse with eating/BM/urination: pain comes after eating or drinking       Pain relieved by BM: YES- sometimes    Therapies tried and outcome: pepcid, imodium AD    LMP:  not applicable      Problem list and histories reviewed & adjusted, as indicated.  Additional history: as documented    Patient Active Problem List   Diagnosis     Frequent UTI     Hyperlipidemia with target LDL less than 130     Esophageal reflux     Past Surgical History:   Procedure Laterality Date     APPENDECTOMY       reflux bladder surgery  1979     TONSILLECTOMY         Social History   Substance Use Topics     Smoking status: Never Smoker     Smokeless tobacco: Never Used     Alcohol use 1.2 oz/week     2 Standard drinks or equivalent per week      Comment: occasionally 4-6  drinks weekly     Family History   Problem Relation Age of Onset     DIABETES Mother      Hypertension Mother      Breast Cancer Mother 65     stage 1 -- double mastectomy     CANCER Father      renal cancer/bladder cancer     DIABETES Father      diet controlled     Genetic Disorder Sister      mosaic Norton's     Family History Negative Sister      Breast Cancer Maternal Grandmother 48     C.A.D. Maternal Grandfather 62     smoker     Unknown/Adopted Paternal Grandmother      CHF, old age -- 100     C.A.D. Paternal Grandfather 79     smoker     Obesity  Brother      possible dyslipidemia         Current Outpatient Prescriptions   Medication Sig Dispense Refill     albuterol (PROAIR HFA, PROVENTIL HFA, VENTOLIN HFA) 108 (90 BASE) MCG/ACT inhaler Inhale 2 puffs into the lungs every 4 hours as needed 1 Inhaler 1     Cranberry 450 MG TABS Take  by mouth. daily       D-Mannose POWD Take 1 tsp. by mouth daily       multivitamin, therapeutic with minerals (MULTI-VITAMIN) TABS Take 1 tablet by mouth daily       Probiotic Product (PROBIOTIC & ACIDOPHILUS EX ST) CAPS Take  by mouth. Daily       pantoprazole (PROTONIX) 40 MG EC tablet Take 1 tablet (40 mg) by mouth daily Take 30-60 minutes before a meal. 90 tablet 0     Allergies   Allergen Reactions     Levofloxacin Hives     Levaquin - hives on face     Acetaminophen Hives and Rash     Honey-[place]       Aspirin Hives and Rash     Honey-[place]     Calcium Carbonate Hives and Rash     Honey-[place]     Citric Acid Hives and Rash     [place]     Potassium Bicarbonate Hives and Rash     Honey-[place]     Sodium Bicarbonate Hives and Rash     Honey-[place]     Labs reviewed in EPIC    ROS:  Constitutional, HEENT, cardiovascular, pulmonary, gi and gu systems are negative, except as otherwise noted.    OBJECTIVE:                                                    /82  Pulse 92  Temp 98  F (36.7  C) (Tympanic)  Wt 197 lb (89.4 kg)  LMP 06/06/2018  SpO2 95%  BMI 32.78 kg/m2  Body mass index is 32.78 kg/(m^2).  GENERAL APPEARANCE: healthy, alert and no distress  RESP: lungs clear to auscultation - no rales, rhonchi or wheezes  CV: regular rates and rhythm, normal S1 S2, no S3 or S4 and no murmur, click or rub  ABDOMEN: bowel sounds normal, obese, no hepatosplenomegaly or masses and soft, but tender diffusely more so in the upper quadrants  PSYCH: mentation appears normal and affect normal/bright       ASSESSMENT/PLAN:                                                    1. Abdominal pain, generalized    - XR ABDOMEN 2 VW  (Clinic Performed); Future  - CBC with platelets differential  - ESR: Erythrocyte sedimentation rate  - Endomysial Antibody IgA by IFA  - Tissue transglutaminase antibody IgA  - Deamidated Giladin Peptide Gloria IgA IgG    2. Functional diarrhea  Happens now and again  - CBC with platelets differential  - ESR: Erythrocyte sedimentation rate  - Endomysial Antibody IgA by IFA  - Tissue transglutaminase antibody IgA  - Deamidated Giladin Peptide Gloria IgA IgG    3. Slow transit constipation  Trial enema but if no help she might need to add some fiber to her diet    4. Gastroesophageal reflux disease, esophagitis presence not specified  Re-start protonix for 2 mos and f/u prn    Patient was agreeable to this plan and had no further questions.  See Patient Instructions    Jacqui Lee MD  [facility]

## 2018-06-11 NOTE — TELEPHONE ENCOUNTER
8:18 AM    Reason for Call: OVERBOOK    Patient is having the following symptoms: stomach cramps for 12 days.    The patient is requesting an appointment for 06/11/18 with .    Was an appointment offered for this call? No  If yes : Appointment type              Date    Preferred method for responding to this message: Telephone Call  What is your phone number ?[phone]    If we cannot reach you directly, may we leave a detailed response at the number you provided? Yes    Can this message wait until your PCP/provider returns, if unavailable today? Not applicable, PCP is in     [facility]

## 2018-06-11 NOTE — PATIENT INSTRUCTIONS
1.  Trial enema tonight  2.  If no results or just water, start citrucel powder 1-2 tsp daily mixed with water  Follow up if not improving

## 2018-06-11 NOTE — MR AVS SNAPSHOT
After Visit Summary   6/11/2018    Shayy Stratton    MRN: [id number]           Patient Information     Date Of Birth          1975        Visit Information        Provider Department      6/11/2018 1:30 PM Jacqui Lee MD [facility]        Today's Diagnoses     Abdominal pain, generalized    -  1    Functional diarrhea        Slow transit constipation          Care Instructions    1.  Trial enema tonight  2.  If no results or just water, start citrucel powder 1-2 tsp daily mixed with water  Follow up if not improving          Follow-ups after your visit        Who to contact     If you have questions or need follow up information about today's clinic visit or your schedule please contact [facility] directly at [phone].  Normal or non-critical lab and imaging results will be communicated to you by JobSlothart, letter or phone within 4 business days after the clinic has received the results. If you do not hear from us within 7 days, please contact the clinic through JobSlothart or phone. If you have a critical or abnormal lab result, we will notify you by phone as soon as possible.  Submit refill requests through Mems-ID or call your pharmacy and they will forward the refill request to us. Please allow 3 business days for your refill to be completed.          Additional Information About Your Visit        MyChart Information     Mems-ID gives you secure access to your electronic health record. If you see a primary care provider, you can also send messages to your care team and make appointments. If you have questions, please call your primary care clinic.  If you do not have a primary care provider, please call [phone] and they will assist you.        Care EveryWhere ID     This is your Care EveryWhere ID. This could be used by other organizations to access your [facility] medical records  [id number]        Your Vitals Were     Pulse Temperature Last Period Pulse  Oximetry BMI (Body Mass Index)       92 98  F (36.7  C) (Tympanic) 06/06/2018 95% 32.78 kg/m2        Blood Pressure from Last 3 Encounters:   06/11/18 116/82   06/07/18 119/81   05/01/18 114/80    Weight from Last 3 Encounters:   06/11/18 197 lb (89.4 kg)   06/07/18 190 lb (86.2 kg)   05/01/18 205 lb (93 kg)              We Performed the Following     CBC with platelets differential     Deamidated Giladin Peptide Gloria IgA IgG     Endomysial Antibody IgA by IFA     ESR: Erythrocyte sedimentation rate     Tissue transglutaminase antibody IgA        Primary Care Provider Office Phone # Fax #    Jacqui Lee [phone]       [facility] [place] [address]  [place] [address]        Equal Access to Services     BRANDI MARINELLI : Hadii aad ku hadasho Soomaali, waaxda luqadaha, qaybta kaalmada adeegyada, lea mckayin hayaan chidi davidson . So [facility] [phone].    ATENCIÓN: Si habla español, tiene a huizar disposición servicios gratuitos de asistencia lingüística. Georgeame al [phone].    We comply with applicable federal civil rights laws and [place] laws. We do not discriminate on the basis of race, color, national origin, age, disability, sex, sexual orientation, or gender identity.            Thank you!     Thank you for choosing [facility]  for your care. Our goal is always to provide you with excellent care. Hearing back from our patients is one way we can continue to improve our services. Please take a few minutes to complete the written survey that you may receive in the mail after your visit with us. Thank you!             Your Updated Medication List - Protect others around you: Learn how to safely use, store and throw away your medicines at www.disposemymeds.org.          This list is accurate as of 6/11/18  2:56 PM.  Always use your most recent med list.                   Brand Name Dispense Instructions for use Diagnosis    albuterol 108 (90 Base) MCG/ACT Inhaler     PROAIR HFA/PROVENTIL HFA/VENTOLIN HFA    1 Inhaler    Inhale 2 puffs into the lungs every 4 hours as needed    Allergic rhinitis, cause unspecified       Cranberry 450 MG Tabs      Take  by mouth. daily        D-Mannose Powd      Take 1 tsp. by mouth daily        Multi-vitamin Tabs tablet      Take 1 tablet by mouth daily        PROBIOTIC & ACIDOPHILUS EX ST Caps      Take  by mouth. Daily

## 2018-06-11 NOTE — NURSING NOTE
"Chief Complaint   Patient presents with     Abdominal Pain       Initial /82  Pulse 92  Temp 98  F (36.7  C) (Tympanic)  Wt 197 lb (89.4 kg)  LMP 06/06/2018  SpO2 95%  BMI 32.78 kg/m2 Estimated body mass index is 32.78 kg/(m^2) as calculated from the following:    Height as of 6/7/18: 5' 5\" (1.651 m).    Weight as of this encounter: 197 lb (89.4 kg).  Medication Reconciliation: complete    Rachel Amador MA    "

## 2018-06-12 ASSESSMENT — PATIENT HEALTH QUESTIONNAIRE - PHQ9: SUM OF ALL RESPONSES TO PHQ QUESTIONS 1-9: 6

## 2018-06-12 ASSESSMENT — ANXIETY QUESTIONNAIRES: GAD7 TOTAL SCORE: 0

## 2018-06-13 ENCOUNTER — TELEPHONE (OUTPATIENT)
Dept: FAMILY MEDICINE | Facility: OTHER | Age: 43
End: 2018-06-13

## 2018-06-13 LAB
GLIADIN IGA SER-ACNC: 4 U/ML
GLIADIN IGG SER-ACNC: <1 U/ML
TTG IGA SER-ACNC: <1 U/ML

## 2018-06-13 NOTE — TELEPHONE ENCOUNTER
A small about of results.  Small soft stools the first day and then its been small amounts of diarrhea.

## 2018-06-13 NOTE — LETTER
June 13, 2018      Shayy Stratton  [address] [place] [id number]        To Whom It May Concern:    Shayy Stratton  was seen on 06/11/18.  Please excuse her  until 06/14/18 due to illness.        Sincerely,        Jacqui Lee MD

## 2018-06-13 NOTE — TELEPHONE ENCOUNTER
11:12 AM    Reason for Call: Phone Call    Description: Pt called and stated she was seen on 06/11/18 for stomach pain. She has been taking the Citracel and protonix, but pt is still having diarrhea and stomach cramps. Pt has a work test tomorrow, but she feels like she should not take it. Please call her back at [phone]    Was an appointment offered for this call? No  If yes : Appointment type              Date    Preferred method for responding to this message: Telephone Call  What is your phone number ?    If we cannot reach you directly, may we leave a detailed response at the number you provided? Yes    Can this message wait until your PCP/provider returns, if available today? Not applicable    Benita Leyva

## 2018-06-14 LAB — ENDOMYSIUM IGA TITR SER IF: NORMAL {TITER}

## 2018-08-16 ENCOUNTER — TELEPHONE (OUTPATIENT)
Dept: FAMILY MEDICINE | Facility: OTHER | Age: 43
End: 2018-08-16

## 2018-08-16 DIAGNOSIS — N39.0 FREQUENT UTI: Primary | ICD-10-CM

## 2018-08-16 RX ORDER — SULFAMETHOXAZOLE/TRIMETHOPRIM 800-160 MG
TABLET ORAL
Refills: 0 | COMMUNITY
Start: 2018-08-01 | End: 2018-08-16

## 2018-08-16 RX ORDER — SULFAMETHOXAZOLE/TRIMETHOPRIM 800-160 MG
TABLET ORAL
Qty: 10 TABLET | Refills: 0 | Status: SHIPPED | OUTPATIENT
Start: 2018-08-16 | End: 2019-02-14

## 2018-08-16 NOTE — TELEPHONE ENCOUNTER
3:24 PM    Reason for Call: Phone Call    Description: Pt called and states that she was in the [facility] the other day and she had a UTI she was on something and felt better and than after the medications ran out 3 days later she felt the same way she was wondering if a lab could be put in and she could do it at the lab in [place].     Was an appointment offered for this call? No  If yes : Appointment type              Date    Preferred method for responding to this message: Telephone Call  What is your phone number ?[phone]    If we cannot reach you directly, may we leave a detailed response at the number you provided? Yes    Can this message wait until your PCP/provider returns, if available today? Not applicable, PCP is in     Jesenia Smith

## 2019-02-11 ENCOUNTER — TELEPHONE (OUTPATIENT)
Dept: FAMILY MEDICINE | Facility: OTHER | Age: 44
End: 2019-02-11

## 2019-02-11 DIAGNOSIS — Z12.39 BREAST CANCER SCREENING: Primary | ICD-10-CM

## 2019-02-11 NOTE — TELEPHONE ENCOUNTER
10:29 AM    Reason for Call: Phone Call    Description: Pt has scheduled physical for 03-13-19 (first available), but would like orders for labs to take a pregnancy test now. She states she hasn't gotten her period, but took a home pregnancy test and it was negative. She would like labs done just to be sure.    Was an appointment offered for this call? Yes  If yes : Appointment type              Date has appt 03-13-19    Preferred method for responding to this message: Telephone Call  What is your phone number ? [phone]     If we cannot reach you directly, may we leave a detailed response at the number you provided? Yes    Can this message wait until your PCP/provider returns, if available today? Not applicable, provider is in today    Mallorie Kinney

## 2019-02-11 NOTE — TELEPHONE ENCOUNTER
Pended urine. Unless you want a serum. Please advise. Ill call the patient after orders are signed. Thank you

## 2019-02-14 PROBLEM — J45.20 INTERMITTENT ASTHMA: Status: ACTIVE | Noted: 2019-02-14

## 2019-02-14 NOTE — PROGRESS NOTES
SUBJECTIVE:   Shayy Stratton is a 43 year old female who presents to clinic today for the following health issues:        Pregnancy Test      Duration: 1.5 months    Description (location/character/radiation): Last period end of December    Intensity:  mild    Accompanying signs and symptoms: Missed period, low ABD cramping    History (similar episodes/previous evaluation): None    Precipitating or alleviating factors: None    Therapies tried and outcome: Pregnancy test       URINARY TRACT SYMPTOMS      Duration: 1 month    Description  dysuria, urgency and follow-up UTI    Intensity:  moderate    Accompanying signs and symptoms:  Fever/chills: no   Flank pain no   Nausea and vomiting: no   Vaginal symptoms: itching and dyspareunia (pain in labia/pelvis with intercourse)  Abdominal/Pelvic Pain: YES    History  History of frequent UTI's: YES  History of kidney stones: no   Sexually Active: YES  Possibility of pregnancy: Yes    Precipitating or alleviating factors: None    Therapies tried and outcome: none         Problem list and histories reviewed & adjusted, as indicated.  Additional history: as documented    Patient Active Problem List   Diagnosis     Frequent UTI     Hyperlipidemia with target LDL less than 130     Esophageal reflux     Intermittent asthma     Past Surgical History:   Procedure Laterality Date     APPENDECTOMY       reflux bladder surgery  1979     TONSILLECTOMY         Social History     Tobacco Use     Smoking status: Never Smoker     Smokeless tobacco: Never Used   Substance Use Topics     Alcohol use: Yes     Alcohol/week: 1.2 oz     Types: 2 Standard drinks or equivalent per week     Comment: occasionally 4-6  drinks weekly     Family History   Problem Relation Age of Onset     Diabetes Mother      Hypertension Mother      Breast Cancer Mother 65        stage 1 -- double mastectomy     Cancer Father         renal cancer/bladder cancer     Diabetes Father         diet controlled     Genetic  Disorder Sister         mosaic Norton's     Family History Negative Sister      Breast Cancer Maternal Grandmother 48     C.A.D. Maternal Grandfather 62        smoker     Unknown/Adopted Paternal Grandmother         CHF, old age -- 100     C.A.D. Paternal Grandfather 79        smoker     Obesity Brother         possible dyslipidemia         Current Outpatient Medications   Medication Sig Dispense Refill     albuterol (PROAIR HFA, PROVENTIL HFA, VENTOLIN HFA) 108 (90 BASE) MCG/ACT inhaler Inhale 2 puffs into the lungs every 4 hours as needed 1 Inhaler 1     Cranberry 450 MG TABS Take  by mouth. daily       D-Mannose POWD Take 1 tsp. by mouth daily       multivitamin, therapeutic with minerals (MULTI-VITAMIN) TABS Take 1 tablet by mouth daily       pantoprazole (PROTONIX) 40 MG EC tablet Take 1 tablet (40 mg) by mouth daily Take 30-60 minutes before a meal. 90 tablet 0     Probiotic Product (PROBIOTIC & ACIDOPHILUS EX ST) CAPS Take  by mouth. Daily       Allergies   Allergen Reactions     Levofloxacin Hives     Levaquin - hives on face     Acetaminophen Hives and Rash     Honey-[place]       Aspirin Hives and Rash     Honey-[place]     Calcium Carbonate Hives and Rash     Honey-[place]     Citric Acid Hives and Rash     [place]     Potassium Bicarbonate Hives and Rash     [place]     Sodium Bicarbonate Hives and Rash     Honey-[place]     BP Readings from Last 3 Encounters:   02/15/19 (!) 130/92   06/11/18 116/82   06/07/18 119/81    Wt Readings from Last 3 Encounters:   02/15/19 89.4 kg (197 lb)   06/11/18 89.4 kg (197 lb)   06/07/18 86.2 kg (190 lb)                  Labs reviewed in EPIC    Reviewed and updated as needed this visit by clinical staff       Reviewed and updated as needed this visit by Provider         ROS:  Constitutional, HEENT, cardiovascular, pulmonary, gi and gu systems are negative, except as otherwise noted.    OBJECTIVE:                                                    BP (!) 130/92    "Pulse 91   Temp 97.9  F (36.6  C) (Tympanic)   Ht 1.651 m (5' 5\")   Wt 89.4 kg (197 lb)   SpO2 98%   BMI 32.78 kg/m     Body mass index is 32.78 kg/m .  GENERAL APPEARANCE: healthy, alert and no distress  RESP: lungs clear to auscultation - no rales, rhonchi or wheezes  CV: regular rates and rhythm, normal S1 S2, no S3 or S4 and no murmur, click or rub  PSYCH: mentation appears normal and affect normal/bright       ASSESSMENT/PLAN:                                                    1. Dysuria  negative  - UA reflex to Microscopic and Culture - [place]    2. Amenorrhea  hcg test was negative for pregnancy  Secondary amenorrhea -- likely second to stress from government shut down  If no improvement, will get further work up with ultrasound  Trial NAC daily  - TSH with free T4 reflex    Patient was agreeable to this plan and had no further questions.  See Patient Instructions    Jacqui Lee MD  [facility] [place]    "

## 2019-02-15 ENCOUNTER — OFFICE VISIT (OUTPATIENT)
Dept: FAMILY MEDICINE | Facility: OTHER | Age: 44
End: 2019-02-15
Attending: FAMILY MEDICINE
Payer: OTHER GOVERNMENT

## 2019-02-15 VITALS
SYSTOLIC BLOOD PRESSURE: 130 MMHG | WEIGHT: 197 LBS | OXYGEN SATURATION: 98 % | HEIGHT: 65 IN | TEMPERATURE: 97.9 F | HEART RATE: 91 BPM | DIASTOLIC BLOOD PRESSURE: 92 MMHG | BODY MASS INDEX: 32.82 KG/M2

## 2019-02-15 DIAGNOSIS — N94.6 DYSMENORRHEA: ICD-10-CM

## 2019-02-15 DIAGNOSIS — R30.0 DYSURIA: Primary | ICD-10-CM

## 2019-02-15 DIAGNOSIS — N13.70 VESICO-URETERIC REFLUX: ICD-10-CM

## 2019-02-15 DIAGNOSIS — N91.2 AMENORRHEA: ICD-10-CM

## 2019-02-15 LAB
ALBUMIN UR-MCNC: NEGATIVE MG/DL
APPEARANCE UR: CLEAR
BILIRUB UR QL STRIP: NEGATIVE
COLOR UR AUTO: NORMAL
CREAT SERPL-MCNC: 0.82 MG/DL (ref 0.52–1.04)
GFR SERPL CREATININE-BSD FRML MDRD: 87 ML/MIN/{1.73_M2}
GLUCOSE UR STRIP-MCNC: NEGATIVE MG/DL
HCG UR QL: NEGATIVE
HGB UR QL STRIP: NEGATIVE
KETONES UR STRIP-MCNC: NEGATIVE MG/DL
LEUKOCYTE ESTERASE UR QL STRIP: NEGATIVE
NITRATE UR QL: NEGATIVE
PH UR STRIP: 5.5 PH (ref 4.7–8)
SOURCE: NORMAL
SP GR UR STRIP: 1.02 (ref 1–1.03)
TSH SERPL DL<=0.005 MIU/L-ACNC: 1.65 MU/L (ref 0.4–4)
UROBILINOGEN UR STRIP-MCNC: NORMAL MG/DL (ref 0–2)

## 2019-02-15 PROCEDURE — 99214 OFFICE O/P EST MOD 30 MIN: CPT | Performed by: FAMILY MEDICINE

## 2019-02-15 PROCEDURE — 81025 URINE PREGNANCY TEST: CPT | Performed by: FAMILY MEDICINE

## 2019-02-15 PROCEDURE — 82565 ASSAY OF CREATININE: CPT | Performed by: UROLOGY

## 2019-02-15 PROCEDURE — 36415 COLL VENOUS BLD VENIPUNCTURE: CPT | Performed by: FAMILY MEDICINE

## 2019-02-15 PROCEDURE — 84443 ASSAY THYROID STIM HORMONE: CPT | Performed by: FAMILY MEDICINE

## 2019-02-15 PROCEDURE — 81003 URINALYSIS AUTO W/O SCOPE: CPT | Performed by: FAMILY MEDICINE

## 2019-02-15 ASSESSMENT — MIFFLIN-ST. JEOR: SCORE: 1549.47

## 2019-02-15 ASSESSMENT — PAIN SCALES - GENERAL: PAINLEVEL: MILD PAIN (3)

## 2019-02-15 NOTE — NURSING NOTE
"Chief Complaint   Patient presents with     Pregnancy Test       Initial BP (!) 130/92   Pulse 91   Temp 97.9  F (36.6  C) (Tympanic)   Ht 1.651 m (5' 5\")   Wt 89.4 kg (197 lb)   SpO2 98%   BMI 32.78 kg/m   Estimated body mass index is 32.78 kg/m  as calculated from the following:    Height as of this encounter: 1.651 m (5' 5\").    Weight as of this encounter: 89.4 kg (197 lb).  Medication Reconciliation: complete    Letitia Park LPN  "

## 2019-02-15 NOTE — LETTER
My Asthma Action Plan  Name: Shayy Stratton   YOB: 1975  Date: 2/14/2019   My doctor: Jacqui Lee MD   [facility]        My Control Medicine: None  My Rescue Medicine: Albuterol (Proair/Ventolin/Proventil) inhaler 2 puffs every 4 hours as needed   My Asthma Severity: intermittent  Avoid your asthma triggers: smoke, upper respiratory infections, exercise or sports and cold air               GREEN ZONE   Good Control    I feel good    No cough or wheeze    Can work, sleep and play without asthma symptoms       Take your asthma control medicine every day.     1. If exercise triggers your asthma, take your rescue medication    15 minutes before exercise or sports, and    During exercise if you have asthma symptoms  2. Spacer to use with inhaler: If you have a spacer, make sure to use it with your inhaler             YELLOW ZONE Getting Worse  I have ANY of these:    I do not feel good    Cough or wheeze    Chest feels tight    Wake up at night   1. Keep taking your Green Zone medications  2. Start taking your rescue medicine:    every 20 minutes for up to 1 hour. Then every 4 hours for 24-48 hours.  3. If you stay in the Yellow Zone for more than 12-24 hours, contact your doctor.  4. If you do not return to the Green Zone in 12-24 hours or you get worse, start taking your oral steroid medicine if prescribed by your provider.           RED ZONE Medical Alert - Get Help  I have ANY of these:    I feel awful    Medicine is not helping    Breathing getting harder    Trouble walking or talking    Nose opens wide to breathe       1. Take your rescue medicine NOW  2. If your provider has prescribed an oral steroid medicine, start taking it NOW  3. Call your doctor NOW  4. If you are still in the Red Zone after 20 minutes and you have not reached your doctor:    Take your rescue medicine again and    Call 911 or go to the emergency room right away    See your regular doctor  within 2 weeks of an Emergency Room or Urgent Care visit for follow-up treatment.          Annual Reminders:  Meet with Asthma Educator,  Flu Shot in the Fall, consider Pneumonia Vaccination for patients with asthma (aged 19 and older).    Pharmacy:    [address] [facility] [address] - [address] AT [facility] OF  [address]                      Asthma Triggers  How To Control Things That Make Your Asthma Worse    Triggers are things that make your asthma worse.  Look at the list below to help you find your triggers and what you can do about them.  You can help prevent asthma flare-ups by staying away from your triggers.      Trigger                                                          What you can do   Cigarette Smoke  Tobacco smoke can make asthma worse. Do not allow smoking in your home, car or around you.  Be sure no one smokes at a child s day care or school.  If you smoke, ask your health care provider for ways to help you quit.  Ask family members to quit too.  Ask your health care provider for a referral to Quit Plan to help you quit smoking, or call 0-486-851-PLAN.     Colds, Flu, Bronchitis  These are common triggers of asthma. Wash your hands often.  Don t touch your eyes, nose or mouth.  Get a flu shot every year.     Dust Mites  These are tiny bugs that live in cloth or carpet. They are too small to see. Wash sheets and blankets in hot water every week.   Encase pillows and mattress in dust mite proof covers.  Avoid having carpet if you can. If you have carpet, vacuum weekly.   Use a dust mask and HEPA vacuum.   Pollen and Outdoor Mold  Some people are allergic to trees, grass, or weed pollen, or molds. Try to keep your windows closed.  Limit time out doors when pollen count is high.   Ask you health care provider about taking medicine during allergy season.     Animal Dander  Some people are allergic to skin flakes, urine or saliva from  pets with fur or feathers. Keep pets with fur or feathers out of your home.    If you can t keep the pet outdoors, then keep the pet out of your bedroom.  Keep the bedroom door closed.  Keep pets off cloth furniture and away from stuffed toys.     Mice, Rats, and Cockroaches  Some people are allergic to the waste from these pests.   Cover food and garbage.  Clean up spills and food crumbs.  Store grease in the refrigerator.   Keep food out of the bedroom.   Indoor Mold  This can be a trigger if your home has high moisture. Fix leaking faucets, pipes, or other sources of water.   Clean moldy surfaces.  Dehumidify basement if it is damp and smelly.   Smoke, Strong Odors, and Sprays  These can reduce air quality. Stay away from strong odors and sprays, such as perfume, powder, hair spray, paints, smoke incense, paint, cleaning products, candles and new carpet.   Exercise or Sports  Some people with asthma have this trigger. Be active!  Ask your doctor about taking medicine before sports or exercise to prevent symptoms.    Warm up for 5-10 minutes before and after sports or exercise.     Other Triggers of Asthma  Cold air:  Cover your nose and mouth with a scarf.  Sometimes laughing or crying can be a trigger.  Some medicines and food can trigger asthma.

## 2019-03-06 NOTE — PROGRESS NOTES
SUBJECTIVE:   CC: Shayy Stratton is an 43 year old woman who presents for preventive health visit.     Healthy Habits:    Do you get at least three servings of calcium containing foods daily (dairy, green leafy vegetables, etc.)? yes    Amount of exercise or daily activities, outside of work: walking    Problems taking medications regularly No    Medication side effects: No    Have you had an eye exam in the past two years? yes    Do you see a dentist twice per year? yes    Do you have sleep apnea, excessive snoring or daytime drowsiness?no      Hyperlipidemia Follow-Up      Rate your low fat/cholesterol diet?: not monitoring fat    Taking statin?  No    Other lipid medications/supplements?:  none    Asthma Follow-Up    Was ACT completed today?    Yes    ACT Total Scores 3/13/2019   ACT TOTAL SCORE -   ASTHMA ER VISITS -   ASTHMA HOSPITALIZATIONS -   ACT TOTAL SCORE (Goal Greater than or Equal to 20) 25   In the past 12 months, how many times did you visit the emergency room for your asthma without being admitted to the hospital? 0   In the past 12 months, how many times were you hospitalized overnight because of your asthma? 0       Recent asthma triggers that patient is dealing with: humidity, exercise or sports and cold air        Today's PHQ-2 Score:   PHQ-2 ( 1999 Pfizer) 2/15/2019   Q1: Little interest or pleasure in doing things 0   Q2: Feeling down, depressed or hopeless 0   PHQ-2 Score 0       Abuse: Current or Past(Physical, Sexual or Emotional)- No  Do you feel safe in your environment? Yes    Social History     Tobacco Use     Smoking status: Never Smoker     Smokeless tobacco: Never Used   Substance Use Topics     Alcohol use: Yes     Alcohol/week: 1.2 oz     Types: 2 Standard drinks or equivalent per week     Comment: occasionally 4-6  drinks weekly     If you drink alcohol do you typically have >3 drinks per day or >7 drinks per week? No                     Reviewed orders with patient.  Reviewed  health maintenance and updated orders accordingly - Yes  Labs reviewed in EPIC  BP Readings from Last 3 Encounters:   03/13/19 122/64   02/15/19 (!) 130/92   06/11/18 116/82    Wt Readings from Last 3 Encounters:   03/13/19 92.1 kg (203 lb)   02/15/19 89.4 kg (197 lb)   06/11/18 89.4 kg (197 lb)                  Patient Active Problem List   Diagnosis     Frequent UTI     Hyperlipidemia with target LDL less than 130     Esophageal reflux     Intermittent asthma     Obesity (BMI 35.0-39.9) with comorbidity (H)     Pelvic pain in female     Secondary amenorrhea     Past Surgical History:   Procedure Laterality Date     APPENDECTOMY  1979     reflux bladder surgery  1979     TONSILLECTOMY  1990       Social History     Tobacco Use     Smoking status: Never Smoker     Smokeless tobacco: Never Used   Substance Use Topics     Alcohol use: Yes     Alcohol/week: 1.2 oz     Types: 2 Standard drinks or equivalent per week     Frequency: 2-3 times a week     Drinks per session: 1 or 2     Comment: occasionally 4-6  drinks weekly     Family History   Problem Relation Age of Onset     Diabetes Mother      Hypertension Mother      Breast Cancer Mother 65        stage 1 -- double mastectomy     Hyperlipidemia Mother      Cancer Father         renal cancer/bladder cancer     Diabetes Father         diet controlled     Genetic Disorder Sister         mosaic Norton's     Family History Negative Sister      Breast Cancer Maternal Grandmother 48     C.A.D. Maternal Grandfather 62        smoker     Unknown/Adopted Paternal Grandmother         CHF, old age -- 100     C.A.D. Paternal Grandfather 79        smoker     Obesity Brother         possible dyslipidemia         Current Outpatient Medications   Medication Sig Dispense Refill     Acetylcysteine (NAC PO) Take 600 mg by mouth daily       albuterol (PROAIR HFA, PROVENTIL HFA, VENTOLIN HFA) 108 (90 BASE) MCG/ACT inhaler Inhale 2 puffs into the lungs every 4 hours as needed 1 Inhaler 1      Cranberry 450 MG TABS Take  by mouth. daily       D-Mannose POWD Take 1 tsp. by mouth daily       methylcellulose (CITRUCEL) powder Take by mouth as needed       multivitamin, therapeutic with minerals (MULTI-VITAMIN) TABS Take 1 tablet by mouth daily       pantoprazole (PROTONIX) 40 MG EC tablet Take 1 tablet (40 mg) by mouth daily Take 30-60 minutes before a meal. 90 tablet 0     Probiotic Product (PROBIOTIC & ACIDOPHILUS EX ST) CAPS Take  by mouth. Daily       ranitidine (ZANTAC) 300 MG tablet Take 1 tablet (300 mg) by mouth At Bedtime 90 tablet 1     Allergies   Allergen Reactions     Levofloxacin Hives     Levaquin - hives on face     Acetaminophen Hives and Rash     Honey-[place]       Aspirin Hives and Rash     Honey-[place]     Calcium Carbonate Hives and Rash     Honey-[place]     Citric Acid Hives and Rash     Honey-[place]     Potassium Bicarbonate Hives and Rash     Honey-[place]     Sodium Bicarbonate Hives and Rash     Honey-[place]       Mammogram Screening: Patient under age 50, mutual decision reflected in health maintenance.      Pertinent mammograms are reviewed under the imaging tab.  History of abnormal Pap smear: NO - age 30-65 PAP every 5 years with negative HPV co-testing recommended  PAP / HPV 2016   PAP NIL NIL     Reviewed and updated as needed this visit by clinical staff  Tobacco  Allergies  Meds  Med Hx  Surg Hx  Fam Hx  Soc Hx        Reviewed and updated as needed this visit by Provider        Past Medical History:   Diagnosis Date     Asthma 2012     Chronic/recurrent back pain 2008     Dyslipidemia 2007     Frequent UTI     cystoscopy 2013,      Geographic tongue      GERD (gastroesophageal reflux disease)      Obesity       Past Surgical History:   Procedure Laterality Date     APPENDECTOMY       reflux bladder surgery       TONSILLECTOMY       Obstetric History       T0      L0     SAB0   TAB0   Ectopic0   Multiple0    "Live Births0           ROS:  CONSTITUTIONAL: NEGATIVE for fever, chills, change in weight  INTEGUMENTARU/SKIN: NEGATIVE for worrisome rashes, moles or lesions  EYES: NEGATIVE for vision changes or irritation  ENT: NEGATIVE for ear, mouth and throat problems  RESP: NEGATIVE for significant cough or SOB  BREAST: NEGATIVE for masses, tenderness or discharge  CV: NEGATIVE for chest pain, palpitations or peripheral edema  GI: NEGATIVE for nausea, abdominal pain, heartburn, or change in bowel habits  : NEGATIVE for unusual urinary or vaginal symptoms. Periods are regular.  MUSCULOSKELETAL: NEGATIVE for significant arthralgias or myalgia  NEURO: NEGATIVE for weakness, dizziness or paresthesias  PSYCHIATRIC: NEGATIVE for changes in mood or affect    OBJECTIVE:   /64   Pulse 80   Temp 97.7  F (36.5  C)   Ht 1.613 m (5' 3.5\")   Wt 92.1 kg (203 lb)   SpO2 98%   BMI 35.40 kg/m    EXAM:  GENERAL: healthy, alert and no distress  EYES: Eyes grossly normal to inspection, PERRL and conjunctivae and sclerae normal  HENT: ear canals and TM's normal, nose and mouth without ulcers or lesions  NECK: no adenopathy, no asymmetry, masses, or scars and thyroid normal to palpation  RESP: lungs clear to auscultation - no rales, rhonchi or wheezes  BREAST: normal without masses, tenderness or nipple discharge and no palpable axillary masses or adenopathy  CV: regular rate and rhythm, normal S1 S2, no S3 or S4, no murmur, click or rub, no peripheral edema and peripheral pulses strong  ABDOMEN: soft, epigastric tenderness, no hepatosplenomegaly, no masses and bowel sounds normal   (female): normal female external genitalia, normal urethral meatus, vaginal mucosa pink, moist, well rugated, and normal cervix/adnexa/uterus without masses or discharge, pap smear done, pain   MS: no gross musculoskeletal defects noted, no edema  SKIN: no suspicious lesions or rashes  NEURO: Normal strength and tone, mentation intact and speech " normal  PSYCH: mentation appears normal, affect normal/bright    Diagnostic Test Results:  Results for orders placed or performed in visit on 03/13/19 (from the past 24 hour(s))   Lipid Profile (Chol, Trig, HDL, LDL calc)   Result Value Ref Range    Cholesterol 254 (H) <200 mg/dL    Triglycerides 297 (H) <150 mg/dL    HDL Cholesterol 56 >49 mg/dL    LDL Cholesterol Calculated 139 (H) <100 mg/dL    Non HDL Cholesterol 198 (H) <130 mg/dL   Comprehensive metabolic panel   Result Value Ref Range    Sodium 136 133 - 144 mmol/L    Potassium 3.9 3.4 - 5.3 mmol/L    Chloride 106 94 - 109 mmol/L    Carbon Dioxide 27 20 - 32 mmol/L    Anion Gap 3 3 - 14 mmol/L    Glucose 97 70 - 99 mg/dL    Urea Nitrogen 15 7 - 30 mg/dL    Creatinine 0.77 0.52 - 1.04 mg/dL    GFR Estimate >90 >60 mL/min/[1.73_m2]    GFR Estimate If Black >90 >60 mL/min/[1.73_m2]    Calcium 8.6 8.5 - 10.1 mg/dL    Bilirubin Total 0.5 0.2 - 1.3 mg/dL    Albumin 4.1 3.4 - 5.0 g/dL    Protein Total 7.9 6.8 - 8.8 g/dL    Alkaline Phosphatase 64 40 - 150 U/L    ALT 22 0 - 50 U/L    AST 11 0 - 45 U/L   UA with Microscopic reflex to Culture - [place]   Result Value Ref Range    Color Urine Light Yellow     Appearance Urine Clear     Glucose Urine Negative NEG^Negative mg/dL    Bilirubin Urine Negative NEG^Negative    Ketones Urine Negative NEG^Negative mg/dL    Specific Gravity Urine 1.020 1.003 - 1.035    Blood Urine Negative NEG^Negative    pH Urine 5.5 4.7 - 8.0 pH    Protein Albumin Urine Negative NEG^Negative mg/dL    Urobilinogen mg/dL Normal 0.0 - 2.0 mg/dL    Nitrite Urine Negative NEG^Negative    Leukocyte Esterase Urine Negative NEG^Negative    Source Midstream Urine     WBC Urine 6 (H) 0 - 5 /HPF    RBC Urine 1 0 - 2 /HPF    Bacteria Urine Few (A) NEG^Negative /HPF    Mucous Urine Present (A) NEG^Negative /LPF       ASSESSMENT/PLAN:   1. Routine general medical examination at a health care facility  Follow-up 1 year  - A pap thin layer screen with  HPV -  "recommended age 30 - 65 years (select HPV order below)    2. Mild intermittent asthma without complication  Stable, cont same meds    3. Gastroesophageal reflux disease, esophagitis presence not specified  Take for 2 mos and work on diet, weight loss, watch sugan  - ranitidine (ZANTAC) 300 MG tablet; Take 1 tablet (300 mg) by mouth At Bedtime  Dispense: 90 tablet; Refill: 1    4. Hyperlipidemia with target LDL less than 130  fasting  - Comprehensive metabolic panel; Future  - Lipid Profile (Chol, Trig, HDL, LDL calc); Future  - Lipid Profile (Chol, Trig, HDL, LDL calc)  - Comprehensive metabolic panel    5. Frequent UTI      6. Dysuria    - UA with Microscopic reflex to Culture - [place]    7. Pelvic pain in female  Was starting to feel better with NAC, continue that and follow-up 2 mos if not improving  - US Pelvic Complete with Transvaginal; Future    8. Secondary amenorrhea    - US Pelvic Complete with Transvaginal; Future    9. Morbid obesity (H)        COUNSELING:   Reviewed preventive health counseling, as reflected in patient instructions  Special attention given to:        Regular exercise       Healthy diet/nutrition       Vision screening       Hearing screening    BP Readings from Last 1 Encounters:   03/13/19 122/64     Estimated body mass index is 35.4 kg/m  as calculated from the following:    Height as of this encounter: 1.613 m (5' 3.5\").    Weight as of this encounter: 92.1 kg (203 lb).      Weight management plan: Discussed healthy diet and exercise guidelines     reports that  has never smoked. she has never used smokeless tobacco.      Counseling Resources:  ATP IV Guidelines  Pooled Cohorts Equation Calculator  Breast Cancer Risk Calculator  FRAX Risk Assessment  ICSI Preventive Guidelines  Dietary Guidelines for Americans, 2010  USDA's MyPlate  ASA Prophylaxis  Lung CA Screening    Jacqui Lee MD  [facility] [place]  "

## 2019-03-06 NOTE — PATIENT INSTRUCTIONS
1.  Fish oil 2000mg daily   ([employer])  2.  NAC 2 tablets daily  3.  Pelvic ultrasound  4.  Start zantac for 2 mos      Preventive Health Recommendations  Female Ages 40 to 49    Yearly exam:     See your health care provider every year in order to  1. Review health changes.   2. Discuss preventive care.    3. Review your medicines if your doctor prescribed any.      Get a Pap test every three years (unless you have an abnormal result and your provider advises testing more often).      If you get Pap tests with HPV test, you only need to test every 5 years, unless you have an abnormal result. You do not need a Pap test if your uterus was removed (hysterectomy) and you have not had cancer.      You should be tested each year for STDs (sexually transmitted diseases), if you're at risk.     Ask your doctor if you should have a mammogram.      Have a colonoscopy (test for colon cancer) if someone in your family has had colon cancer or polyps before age 50.       Have a cholesterol test every 5 years.       Have a diabetes test (fasting glucose) after age 45. If you are at risk for diabetes, you should have this test every 3 years.    Shots: Get a flu shot each year. Get a tetanus shot every 10 years.     Nutrition:     Eat at least 5 servings of fruits and vegetables each day.    Eat whole-grain bread, whole-wheat pasta and brown rice instead of white grains and rice.    Get adequate Calcium and Vitamin D.      Lifestyle    Exercise at least 150 minutes a week (an average of 30 minutes a day, 5 days a week). This will help you control your weight and prevent disease.    Limit alcohol to one drink per day.    No smoking.     Wear sunscreen to prevent skin cancer.    See your dentist every six months for an exam and cleaning.

## 2019-03-13 ENCOUNTER — OFFICE VISIT (OUTPATIENT)
Dept: FAMILY MEDICINE | Facility: OTHER | Age: 44
End: 2019-03-13
Attending: FAMILY MEDICINE
Payer: OTHER GOVERNMENT

## 2019-03-13 VITALS
HEIGHT: 64 IN | HEART RATE: 80 BPM | DIASTOLIC BLOOD PRESSURE: 64 MMHG | WEIGHT: 203 LBS | BODY MASS INDEX: 34.66 KG/M2 | OXYGEN SATURATION: 98 % | TEMPERATURE: 97.7 F | SYSTOLIC BLOOD PRESSURE: 122 MMHG

## 2019-03-13 DIAGNOSIS — E66.01 MORBID OBESITY (H): ICD-10-CM

## 2019-03-13 DIAGNOSIS — K21.9 GASTROESOPHAGEAL REFLUX DISEASE, ESOPHAGITIS PRESENCE NOT SPECIFIED: ICD-10-CM

## 2019-03-13 DIAGNOSIS — E78.5 HYPERLIPIDEMIA WITH TARGET LDL LESS THAN 130: ICD-10-CM

## 2019-03-13 DIAGNOSIS — N91.1 SECONDARY AMENORRHEA: ICD-10-CM

## 2019-03-13 DIAGNOSIS — Z00.00 ROUTINE GENERAL MEDICAL EXAMINATION AT A HEALTH CARE FACILITY: Primary | ICD-10-CM

## 2019-03-13 DIAGNOSIS — R30.0 DYSURIA: ICD-10-CM

## 2019-03-13 DIAGNOSIS — J45.20 MILD INTERMITTENT ASTHMA WITHOUT COMPLICATION: ICD-10-CM

## 2019-03-13 DIAGNOSIS — N39.0 FREQUENT UTI: ICD-10-CM

## 2019-03-13 DIAGNOSIS — R10.2 PELVIC PAIN IN FEMALE: ICD-10-CM

## 2019-03-13 LAB
ALBUMIN SERPL-MCNC: 4.1 G/DL (ref 3.4–5)
ALBUMIN UR-MCNC: NEGATIVE MG/DL
ALP SERPL-CCNC: 64 U/L (ref 40–150)
ALT SERPL W P-5'-P-CCNC: 22 U/L (ref 0–50)
ANION GAP SERPL CALCULATED.3IONS-SCNC: 3 MMOL/L (ref 3–14)
APPEARANCE UR: CLEAR
AST SERPL W P-5'-P-CCNC: 11 U/L (ref 0–45)
BACTERIA #/AREA URNS HPF: ABNORMAL /HPF
BILIRUB SERPL-MCNC: 0.5 MG/DL (ref 0.2–1.3)
BILIRUB UR QL STRIP: NEGATIVE
BUN SERPL-MCNC: 15 MG/DL (ref 7–30)
CALCIUM SERPL-MCNC: 8.6 MG/DL (ref 8.5–10.1)
CHLORIDE SERPL-SCNC: 106 MMOL/L (ref 94–109)
CHOLEST SERPL-MCNC: 254 MG/DL
CO2 SERPL-SCNC: 27 MMOL/L (ref 20–32)
COLOR UR AUTO: ABNORMAL
CREAT SERPL-MCNC: 0.77 MG/DL (ref 0.52–1.04)
GFR SERPL CREATININE-BSD FRML MDRD: >90 ML/MIN/{1.73_M2}
GLUCOSE SERPL-MCNC: 97 MG/DL (ref 70–99)
GLUCOSE UR STRIP-MCNC: NEGATIVE MG/DL
HDLC SERPL-MCNC: 56 MG/DL
HGB UR QL STRIP: NEGATIVE
KETONES UR STRIP-MCNC: NEGATIVE MG/DL
LDLC SERPL CALC-MCNC: 139 MG/DL
LEUKOCYTE ESTERASE UR QL STRIP: NEGATIVE
MUCOUS THREADS #/AREA URNS LPF: PRESENT /LPF
NITRATE UR QL: NEGATIVE
NONHDLC SERPL-MCNC: 198 MG/DL
PH UR STRIP: 5.5 PH (ref 4.7–8)
POTASSIUM SERPL-SCNC: 3.9 MMOL/L (ref 3.4–5.3)
PROT SERPL-MCNC: 7.9 G/DL (ref 6.8–8.8)
RBC #/AREA URNS AUTO: 1 /HPF (ref 0–2)
SODIUM SERPL-SCNC: 136 MMOL/L (ref 133–144)
SOURCE: ABNORMAL
SP GR UR STRIP: 1.02 (ref 1–1.03)
TRIGL SERPL-MCNC: 297 MG/DL
UROBILINOGEN UR STRIP-MCNC: NORMAL MG/DL (ref 0–2)
WBC #/AREA URNS AUTO: 6 /HPF (ref 0–5)

## 2019-03-13 PROCEDURE — 99000 SPECIMEN HANDLING OFFICE-LAB: CPT | Performed by: FAMILY MEDICINE

## 2019-03-13 PROCEDURE — 87624 HPV HI-RISK TYP POOLED RSLT: CPT | Mod: 90 | Performed by: FAMILY MEDICINE

## 2019-03-13 PROCEDURE — G0123 SCREEN CERV/VAG THIN LAYER: HCPCS | Performed by: FAMILY MEDICINE

## 2019-03-13 PROCEDURE — 99213 OFFICE O/P EST LOW 20 MIN: CPT | Mod: 25 | Performed by: FAMILY MEDICINE

## 2019-03-13 PROCEDURE — 80053 COMPREHEN METABOLIC PANEL: CPT | Performed by: FAMILY MEDICINE

## 2019-03-13 PROCEDURE — 80061 LIPID PANEL: CPT | Performed by: FAMILY MEDICINE

## 2019-03-13 PROCEDURE — 81001 URINALYSIS AUTO W/SCOPE: CPT | Performed by: FAMILY MEDICINE

## 2019-03-13 PROCEDURE — 99396 PREV VISIT EST AGE 40-64: CPT | Performed by: FAMILY MEDICINE

## 2019-03-13 PROCEDURE — 36415 COLL VENOUS BLD VENIPUNCTURE: CPT | Performed by: FAMILY MEDICINE

## 2019-03-13 SDOH — HEALTH STABILITY: PHYSICAL HEALTH: ON AVERAGE, HOW MANY DAYS PER WEEK DO YOU ENGAGE IN MODERATE TO STRENUOUS EXERCISE (LIKE A BRISK WALK)?: 4 DAYS

## 2019-03-13 SDOH — SOCIAL STABILITY: SOCIAL INSECURITY: WITHIN THE LAST YEAR, HAVE YOU BEEN HUMILIATED OR EMOTIONALLY ABUSED IN OTHER WAYS BY YOUR PARTNER OR EX-PARTNER?: NO

## 2019-03-13 SDOH — HEALTH STABILITY: MENTAL HEALTH: HOW OFTEN DO YOU HAVE A DRINK CONTAINING ALCOHOL?: 2-3 TIMES A WEEK

## 2019-03-13 SDOH — HEALTH STABILITY: PHYSICAL HEALTH: ON AVERAGE, HOW MANY MINUTES DO YOU ENGAGE IN EXERCISE AT THIS LEVEL?: 30 MIN

## 2019-03-13 SDOH — SOCIAL STABILITY: SOCIAL INSECURITY: WITHIN THE LAST YEAR, HAVE YOU BEEN AFRAID OF YOUR PARTNER OR EX-PARTNER?: NO

## 2019-03-13 SDOH — SOCIAL STABILITY: SOCIAL INSECURITY
WITHIN THE LAST YEAR, HAVE YOU BEEN KICKED, HIT, SLAPPED, OR OTHERWISE PHYSICALLY HURT BY YOUR PARTNER OR EX-PARTNER?: NO

## 2019-03-13 SDOH — HEALTH STABILITY: MENTAL HEALTH: HOW MANY STANDARD DRINKS CONTAINING ALCOHOL DO YOU HAVE ON A TYPICAL DAY?: 1 OR 2

## 2019-03-13 SDOH — SOCIAL STABILITY: SOCIAL INSECURITY
WITHIN THE LAST YEAR, HAVE TO BEEN RAPED OR FORCED TO HAVE ANY KIND OF SEXUAL ACTIVITY BY YOUR PARTNER OR EX-PARTNER?: NO

## 2019-03-13 ASSESSMENT — ASTHMA QUESTIONNAIRES
QUESTION_3 LAST FOUR WEEKS HOW OFTEN DID YOUR ASTHMA SYMPTOMS (WHEEZING, COUGHING, SHORTNESS OF BREATH, CHEST TIGHTNESS OR PAIN) WAKE YOU UP AT NIGHT OR EARLIER THAN USUAL IN THE MORNING: NOT AT ALL
QUESTION_1 LAST FOUR WEEKS HOW MUCH OF THE TIME DID YOUR ASTHMA KEEP YOU FROM GETTING AS MUCH DONE AT WORK, SCHOOL OR AT HOME: NONE OF THE TIME
QUESTION_4 LAST FOUR WEEKS HOW OFTEN HAVE YOU USED YOUR RESCUE INHALER OR NEBULIZER MEDICATION (SUCH AS ALBUTEROL): NOT AT ALL
QUESTION_5 LAST FOUR WEEKS HOW WOULD YOU RATE YOUR ASTHMA CONTROL: COMPLETELY CONTROLLED
ACT_TOTALSCORE: 25
QUESTION_2 LAST FOUR WEEKS HOW OFTEN HAVE YOU HAD SHORTNESS OF BREATH: NOT AT ALL

## 2019-03-13 ASSESSMENT — ANXIETY QUESTIONNAIRES
GAD7 TOTAL SCORE: 0
1. FEELING NERVOUS, ANXIOUS, OR ON EDGE: NOT AT ALL
3. WORRYING TOO MUCH ABOUT DIFFERENT THINGS: NOT AT ALL
5. BEING SO RESTLESS THAT IT IS HARD TO SIT STILL: NOT AT ALL
4. TROUBLE RELAXING: NOT AT ALL
2. NOT BEING ABLE TO STOP OR CONTROL WORRYING: NOT AT ALL
6. BECOMING EASILY ANNOYED OR IRRITABLE: NOT AT ALL
7. FEELING AFRAID AS IF SOMETHING AWFUL MIGHT HAPPEN: NOT AT ALL

## 2019-03-13 ASSESSMENT — MIFFLIN-ST. JEOR: SCORE: 1552.86

## 2019-03-13 ASSESSMENT — PAIN SCALES - GENERAL: PAINLEVEL: NO PAIN (0)

## 2019-03-13 ASSESSMENT — PATIENT HEALTH QUESTIONNAIRE - PHQ9: SUM OF ALL RESPONSES TO PHQ QUESTIONS 1-9: 0

## 2019-03-13 NOTE — NURSING NOTE
"Chief Complaint   Patient presents with     Physical       Initial /64   Pulse 80   Temp 97.7  F (36.5  C)   Ht 1.613 m (5' 3.5\")   Wt 92.1 kg (203 lb)   SpO2 98%   BMI 35.40 kg/m   Estimated body mass index is 35.4 kg/m  as calculated from the following:    Height as of this encounter: 1.613 m (5' 3.5\").    Weight as of this encounter: 92.1 kg (203 lb).  Medication Reconciliation: complete    oHwie Aguilar LPN  "

## 2019-03-14 ENCOUNTER — HOSPITAL ENCOUNTER (OUTPATIENT)
Dept: ULTRASOUND IMAGING | Facility: HOSPITAL | Age: 44
Discharge: HOME | End: 2019-03-14
Attending: FAMILY MEDICINE | Admitting: FAMILY MEDICINE
Payer: OTHER GOVERNMENT

## 2019-03-14 DIAGNOSIS — R10.2 PELVIC PAIN IN FEMALE: ICD-10-CM

## 2019-03-14 DIAGNOSIS — N91.1 SECONDARY AMENORRHEA: ICD-10-CM

## 2019-03-14 PROCEDURE — 76830 TRANSVAGINAL US NON-OB: CPT | Mod: TC

## 2019-03-14 ASSESSMENT — ANXIETY QUESTIONNAIRES: GAD7 TOTAL SCORE: 0

## 2019-03-14 ASSESSMENT — ASTHMA QUESTIONNAIRES: ACT_TOTALSCORE: 25

## 2019-03-18 LAB
COPATH REPORT: NORMAL
PAP: NORMAL

## 2019-03-19 NOTE — PROGRESS NOTES
SUBJECTIVE:   Shayy Stratton is a 43 year old female who presents to clinic today for the following health issues:      Vaginal Symptoms F/u [employer]        Duration: over a month     Description  burning, pelvic pain and pain with intercourse    Intensity:  mild    Accompanying signs and symptoms (fever/dysuria/abdominal or back pain): None    History  Sexually active: yes, single partner, contraception - condoms and withdrawal  Possibility of pregnancy: No  Recent antibiotic use: no     Precipitating or alleviating factors: None    Therapies tried and outcome: none   Outcome: Patient here to discuss results of ultrasound and possible UTI? Dysuria. Menstruation started today.       Problem list and histories reviewed & adjusted, as indicated.  Additional history: as documented    Patient Active Problem List   Diagnosis     Frequent UTI     Hyperlipidemia with target LDL less than 130     Esophageal reflux     Intermittent asthma     Obesity (BMI 35.0-39.9) with comorbidity (H)     Pelvic pain in female     Secondary amenorrhea     Past Surgical History:   Procedure Laterality Date     APPENDECTOMY  1979     reflux bladder surgery  1979     TONSILLECTOMY  1990       Social History     Tobacco Use     Smoking status: Never Smoker     Smokeless tobacco: Never Used   Substance Use Topics     Alcohol use: Yes     Alcohol/week: 1.2 oz     Types: 2 Standard drinks or equivalent per week     Frequency: 2-3 times a week     Drinks per session: 1 or 2     Comment: occasionally 4-6  drinks weekly     Family History   Problem Relation Age of Onset     Diabetes Mother      Hypertension Mother      Breast Cancer Mother 65        stage 1 -- double mastectomy     Hyperlipidemia Mother      Cancer Father         renal cancer/bladder cancer     Diabetes Father         diet controlled     Genetic Disorder Sister         mosaic Norton's     Family History Negative Sister      Breast Cancer Maternal Grandmother 48     C.A.D. Maternal  Grandfather 62        smoker     Unknown/Adopted Paternal Grandmother         CHF, old age -- 100     C.A.D. Paternal Grandfather 79        smoker     Obesity Brother         possible dyslipidemia         Current Outpatient Medications   Medication Sig Dispense Refill     Acetylcysteine (NAC PO) Take 600 mg by mouth daily       albuterol (PROAIR HFA, PROVENTIL HFA, VENTOLIN HFA) 108 (90 BASE) MCG/ACT inhaler Inhale 2 puffs into the lungs every 4 hours as needed 1 Inhaler 1     D-Mannose POWD Take 1 tsp. by mouth daily       methylcellulose (CITRUCEL) powder Take by mouth as needed       multivitamin, therapeutic with minerals (MULTI-VITAMIN) TABS Take 1 tablet by mouth daily       Probiotic Product (PROBIOTIC & ACIDOPHILUS EX ST) CAPS Take  by mouth. Daily       ranitidine (ZANTAC) 300 MG tablet Take 1 tablet (300 mg) by mouth At Bedtime 90 tablet 1     Cranberry 450 MG TABS Take  by mouth. daily       Allergies   Allergen Reactions     Levofloxacin Hives     Levaquin - hives on face     Acetaminophen Hives and Rash     Honey-[place]       Aspirin Hives and Rash     Honey-[place]     Calcium Carbonate Hives and Rash     Honey-[place]     Citric Acid Hives and Rash     Honey-[place]     Potassium Bicarbonate Hives and Rash     Honey-[place]     Sodium Bicarbonate Hives and Rash     Honey-[place]     BP Readings from Last 3 Encounters:   03/27/19 112/86   03/13/19 122/64   02/15/19 (!) 130/92    Wt Readings from Last 3 Encounters:   03/27/19 92.1 kg (203 lb)   03/13/19 92.1 kg (203 lb)   02/15/19 89.4 kg (197 lb)                  Labs reviewed in EPIC    Reviewed and updated as needed this visit by clinical staff       Reviewed and updated as needed this visit by Provider         ROS:  Constitutional, HEENT, cardiovascular, pulmonary, gi and gu systems are negative, except as otherwise noted.    OBJECTIVE:                                                    /86 (BP Location: Left arm, Patient Position: Sitting, Cuff  "Size: Adult Regular)   Pulse 72   Temp 97.6  F (36.4  C) (Tympanic)   Resp 20   Ht 1.613 m (5' 3.5\")   Wt 92.1 kg (203 lb)   LMP 03/27/2019 (Exact Date)   SpO2 98%   BMI 35.40 kg/m     Body mass index is 35.4 kg/m .  GENERAL APPEARANCE: healthy, alert and no distress  RESP: lungs clear to auscultation - no rales, rhonchi or wheezes  CV: regular rates and rhythm, normal S1 S2, no S3 or S4 and no murmur, click or rub  PSYCH: mentation appears normal and affect normal/bright       ASSESSMENT/PLAN:                                                    1. Dysuria  negative  - UA reflex to Microscopic and Culture    2. Uterine leiomyoma, unspecified location  Addendum to ultrasound shows they are not in endometrium, ok to monitor  She has started menses now on the NAC, continue indefinately for now    Patient was agreeable to this plan and had no further questions.  See Patient Instructions    Jacqui Lee MD  [facility] - [place]    "

## 2019-03-20 LAB
FINAL DIAGNOSIS: NORMAL
HPV HR 12 DNA CVX QL NAA+PROBE: NEGATIVE
HPV16 DNA SPEC QL NAA+PROBE: NEGATIVE
HPV18 DNA SPEC QL NAA+PROBE: NEGATIVE
SPECIMEN DESCRIPTION: NORMAL
SPECIMEN SOURCE CVX/VAG CYTO: NORMAL

## 2019-03-27 ENCOUNTER — OFFICE VISIT (OUTPATIENT)
Dept: FAMILY MEDICINE | Facility: OTHER | Age: 44
End: 2019-03-27
Attending: FAMILY MEDICINE
Payer: OTHER GOVERNMENT

## 2019-03-27 VITALS
SYSTOLIC BLOOD PRESSURE: 112 MMHG | WEIGHT: 203 LBS | RESPIRATION RATE: 20 BRPM | HEART RATE: 72 BPM | BODY MASS INDEX: 34.66 KG/M2 | DIASTOLIC BLOOD PRESSURE: 86 MMHG | OXYGEN SATURATION: 98 % | HEIGHT: 64 IN | TEMPERATURE: 97.6 F

## 2019-03-27 DIAGNOSIS — R30.0 DYSURIA: Primary | ICD-10-CM

## 2019-03-27 DIAGNOSIS — D25.9 UTERINE LEIOMYOMA, UNSPECIFIED LOCATION: ICD-10-CM

## 2019-03-27 LAB
ALBUMIN UR-MCNC: NEGATIVE MG/DL
APPEARANCE UR: CLEAR
BILIRUB UR QL STRIP: NEGATIVE
COLOR UR AUTO: NORMAL
GLUCOSE UR STRIP-MCNC: NEGATIVE MG/DL
HGB UR QL STRIP: NEGATIVE
KETONES UR STRIP-MCNC: NEGATIVE MG/DL
LEUKOCYTE ESTERASE UR QL STRIP: NEGATIVE
NITRATE UR QL: NEGATIVE
PH UR STRIP: 7 PH (ref 4.7–8)
SOURCE: NORMAL
SP GR UR STRIP: 1 (ref 1–1.03)
UROBILINOGEN UR STRIP-MCNC: NORMAL MG/DL (ref 0–2)

## 2019-03-27 PROCEDURE — 99213 OFFICE O/P EST LOW 20 MIN: CPT | Performed by: FAMILY MEDICINE

## 2019-03-27 PROCEDURE — 81003 URINALYSIS AUTO W/O SCOPE: CPT | Performed by: FAMILY MEDICINE

## 2019-03-27 ASSESSMENT — MIFFLIN-ST. JEOR: SCORE: 1552.86

## 2019-03-27 ASSESSMENT — PAIN SCALES - GENERAL: PAINLEVEL: NO PAIN (0)

## 2019-03-27 NOTE — NURSING NOTE
"Chief Complaint   Patient presents with     Results     UTI       Initial /86 (BP Location: Left arm, Patient Position: Sitting, Cuff Size: Adult Regular)   Pulse 72   Temp 97.6  F (36.4  C) (Tympanic)   Resp 20   Ht 1.613 m (5' 3.5\")   Wt 92.1 kg (203 lb)   LMP 03/27/2019 (Exact Date)   SpO2 98%   BMI 35.40 kg/m   Estimated body mass index is 35.4 kg/m  as calculated from the following:    Height as of this encounter: 1.613 m (5' 3.5\").    Weight as of this encounter: 92.1 kg (203 lb).  Medication Reconciliation: complete    Mel Pisano LPN  "

## 2019-06-13 DIAGNOSIS — K21.9 GASTROESOPHAGEAL REFLUX DISEASE, ESOPHAGITIS PRESENCE NOT SPECIFIED: ICD-10-CM

## 2019-06-13 NOTE — TELEPHONE ENCOUNTER
Protonix 40mg      Last Written Prescription Date:  Not on list  Last Fill Quantity: -,   # refills: -  Last Office Visit: 3/27/19  Future Office visit:       Routing refill request to provider for review/approval because:  Drug not active on patient's medication list    The source prescription was discontinued on 3/27/2019 by Mel Pisano LPN for the following reason: Dose adjustment.    I dont see anything regarding this in office visit notes from 3/27. Please advise. Thank you!

## 2019-06-14 RX ORDER — PANTOPRAZOLE SODIUM 40 MG/1
40 TABLET, DELAYED RELEASE ORAL DAILY
Qty: 90 TABLET | Refills: 0 | Status: SHIPPED | OUTPATIENT
Start: 2019-06-14 | End: 2019-11-15

## 2019-09-30 ENCOUNTER — TRANSFERRED RECORDS (OUTPATIENT)
Dept: HEALTH INFORMATION MANAGEMENT | Facility: CLINIC | Age: 44
End: 2019-09-30

## 2019-10-07 ENCOUNTER — TRANSFERRED RECORDS (OUTPATIENT)
Dept: HEALTH INFORMATION MANAGEMENT | Facility: CLINIC | Age: 44
End: 2019-10-07

## 2019-10-14 ENCOUNTER — HOSPITAL ENCOUNTER (OUTPATIENT)
Dept: GENERAL RADIOLOGY | Facility: CLINIC | Age: 44
End: 2019-10-14
Attending: UROLOGY | Admitting: UROLOGY
Payer: OTHER GOVERNMENT

## 2019-10-14 ENCOUNTER — HOSPITAL ENCOUNTER (OUTPATIENT)
Facility: CLINIC | Age: 44
Discharge: HOME | End: 2019-10-14
Attending: UROLOGY | Admitting: UROLOGY
Payer: OTHER GOVERNMENT

## 2019-10-14 DIAGNOSIS — N28.89 OBSTRUCTION OF KIDNEY: ICD-10-CM

## 2019-10-14 PROCEDURE — 40000863 ZZH STATISTIC RADIOLOGY XRAY, US, CT, MAR, NM

## 2019-10-14 PROCEDURE — 25500064 ZZH RX 255 OP 636: Performed by: RADIOLOGY

## 2019-10-14 PROCEDURE — 51600 INJECTION FOR BLADDER X-RAY: CPT

## 2019-10-14 RX ADMIN — DIATRIZOATE MEGLUMINE 475 ML: 300 INJECTION, SOLUTION INTRAVENOUS at 14:00

## 2019-10-14 NOTE — PROGRESS NOTES
Care Suites Admission Nursing Note    Reason for admission: Cystogram  CS arrival time: 1230  Accompanied by: Self  Name/phone of DC : AVERY  Medications held: NA  Consent signed: NA  Abnormal assessment/labs: NA  If abnormal, provider notified: NA  Education/questions answered: #16 Fr Caude tip catheter placed with 10 ml balloon. Denies discomfort. Secured with device to right thigh. Tolerated well. Pt has her belongings with her. TO Xray.  Plan: Proceed with Voiding cystogram.

## 2019-11-15 DIAGNOSIS — K21.9 GASTROESOPHAGEAL REFLUX DISEASE, ESOPHAGITIS PRESENCE NOT SPECIFIED: ICD-10-CM

## 2019-11-15 RX ORDER — PANTOPRAZOLE SODIUM 40 MG/1
40 TABLET, DELAYED RELEASE ORAL DAILY
Qty: 90 TABLET | Refills: 0 | Status: SHIPPED | OUTPATIENT
Start: 2019-11-15

## 2019-11-15 NOTE — TELEPHONE ENCOUNTER
pantoprazole (PROTONIX) 40 MG EC tablet  Last visit date with prescribing provider: 3-  Last refill date: 6-  Quantity: 90, Refills: 0    Loreto Hilario LPN

## 2020-03-02 ENCOUNTER — HEALTH MAINTENANCE LETTER (OUTPATIENT)
Age: 45
End: 2020-03-02

## 2020-12-20 ENCOUNTER — HEALTH MAINTENANCE LETTER (OUTPATIENT)
Age: 45
End: 2020-12-20

## 2020-12-31 ENCOUNTER — TRANSFERRED RECORDS (OUTPATIENT)
Dept: HEALTH INFORMATION MANAGEMENT | Facility: CLINIC | Age: 45
End: 2020-12-31

## 2021-02-27 ENCOUNTER — HEALTH MAINTENANCE LETTER (OUTPATIENT)
Age: 46
End: 2021-02-27

## 2021-10-03 ENCOUNTER — HEALTH MAINTENANCE LETTER (OUTPATIENT)
Age: 46
End: 2021-10-03

## 2022-01-22 ENCOUNTER — HEALTH MAINTENANCE LETTER (OUTPATIENT)
Age: 47
End: 2022-01-22

## 2022-03-19 ENCOUNTER — HEALTH MAINTENANCE LETTER (OUTPATIENT)
Age: 47
End: 2022-03-19

## 2022-04-12 ENCOUNTER — OFFICE VISIT (OUTPATIENT)
Dept: FAMILY MEDICINE | Facility: OTHER | Age: 47
End: 2022-04-12
Attending: PHYSICIAN ASSISTANT
Payer: OTHER GOVERNMENT

## 2022-04-12 VITALS
WEIGHT: 218 LBS | HEART RATE: 80 BPM | RESPIRATION RATE: 16 BRPM | BODY MASS INDEX: 38.01 KG/M2 | OXYGEN SATURATION: 96 % | SYSTOLIC BLOOD PRESSURE: 120 MMHG | TEMPERATURE: 97.6 F | DIASTOLIC BLOOD PRESSURE: 84 MMHG

## 2022-04-12 DIAGNOSIS — R30.9 PAINFUL URINATION: Primary | ICD-10-CM

## 2022-04-12 DIAGNOSIS — N30.00 ACUTE CYSTITIS WITHOUT HEMATURIA: ICD-10-CM

## 2022-04-12 LAB
ALBUMIN UR-MCNC: NEGATIVE MG/DL
APPEARANCE UR: CLEAR
BILIRUB UR QL STRIP: NEGATIVE
COLOR UR AUTO: YELLOW
GLUCOSE UR STRIP-MCNC: NEGATIVE MG/DL
HGB UR QL STRIP: NEGATIVE
KETONES UR STRIP-MCNC: NEGATIVE MG/DL
LEUKOCYTE ESTERASE UR QL STRIP: ABNORMAL
MUCOUS THREADS #/AREA URNS LPF: PRESENT /LPF
NITRATE UR QL: NEGATIVE
PH UR STRIP: 6.5 [PH] (ref 5–9)
RBC URINE: 1 /HPF
SP GR UR STRIP: 1.01 (ref 1–1.03)
UROBILINOGEN UR STRIP-MCNC: NORMAL MG/DL
WBC URINE: 14 /HPF

## 2022-04-12 PROCEDURE — 87186 SC STD MICRODIL/AGAR DIL: CPT | Mod: ZL | Performed by: PHYSICIAN ASSISTANT

## 2022-04-12 PROCEDURE — 81001 URINALYSIS AUTO W/SCOPE: CPT | Mod: ZL | Performed by: PHYSICIAN ASSISTANT

## 2022-04-12 PROCEDURE — 99204 OFFICE O/P NEW MOD 45 MIN: CPT | Performed by: PHYSICIAN ASSISTANT

## 2022-04-12 RX ORDER — DICYCLOMINE HCL 20 MG
TABLET ORAL
COMMUNITY
Start: 2021-10-22 | End: 2022-04-12

## 2022-04-12 RX ORDER — NITROFURANTOIN 25; 75 MG/1; MG/1
100 CAPSULE ORAL 2 TIMES DAILY
Qty: 14 CAPSULE | Refills: 0 | Status: SHIPPED | OUTPATIENT
Start: 2022-04-12 | End: 2022-04-19

## 2022-04-12 ASSESSMENT — PAIN SCALES - GENERAL: PAINLEVEL: SEVERE PAIN (6)

## 2022-04-12 NOTE — NURSING NOTE
"Chief Complaint   Patient presents with     UTI     Patient presents to clinic with UTI s/s. Patient stated her s/s consist of bladder pain, urinary urgency and frequency, cloudy urine. Just finished a 5 day course of Bactrim on the 5th. Now her s/s started again on Sunday AM.    Initial BP (!) 124/100 (BP Location: Right arm, Patient Position: Sitting, Cuff Size: Adult Large)   Pulse 80   Temp 97.6  F (36.4  C) (Tympanic)   Resp 16   Wt 98.9 kg (218 lb)   SpO2 96%   Breastfeeding No   BMI 38.01 kg/m   Estimated body mass index is 38.01 kg/m  as calculated from the following:    Height as of 3/27/19: 1.613 m (5' 3.5\").    Weight as of this encounter: 98.9 kg (218 lb).  Medication Reconciliation: Completed     Advanced Care Directive Reviewed    Aron Taylor LPN  "

## 2022-04-12 NOTE — PATIENT INSTRUCTIONS
You were prescribed an antibiotic, please take into consideration the following information:  - Take entire course of antibiotic even if you start to feel better.  - Antibiotics can cause stomach upset including nausea and diarrhea. Read your bottle or ask the pharmacist if antibiotic can be taken with food to help prevent nausea. If you have symptoms of diarrhea you can take an over-the-counter probiotic and/or increase foods with probiotics such as yogurt, Manjinder, sauerkraut.  -Use caution in sunlight as can lead to increased risk of sunburn while on ABX (antibiotics).     Please refer to your AVS for follow up and pain/symptoms management recommendations (I.e.: medications, helpful conservative treatment modalities, appropriate follow up if need to a specialist or family practice, etc.). Please return to urgent care if your symptoms change or worsen.     Discharge instructions:  -Follow up with persistent symptoms with primary care (or ER if severe worsening)  -If you were prescribed a medication(s), please take this as prescribed/directed  -Monitor your symptoms, if changing/worsening, return to UC/ER or PCP for follow up    Urinary Tract Infection (UTI):  -If you were prescribed an antibiotic, please take this as directed and until completion.     -For pain control (if applicable), alternating Tylenol and Ibuprofen is recommended  -Alternate every 4 hours as needed. I.e.: Ibuprofen at 8am, Tylenol 12pm, Ibuprofen 4pm   -Daily maximum of Tylenol is 4000mg (recommend staying under 3000mg)  -Daily maximum of Ibuprofen is 1200mg (take no more than six 200mg pills a day)    -A warm heating pad may help as well.     -Rest/relaxation and keeping hydrated with clear liquids (ie: water or cranberry juice - do not do cranberry juice if on Coumadin/Warfarin).     -Some urine samples are sent out for culture - if a change to your antibiotics is needed based off your urine culture, a member of the urgent care team will call  you and inform you of this.     -Empty your bladder when you feel the urge versus holding urine, after urinating you can bear-down to empty bladder completely, after peeing wipe front to back to avoid introducing bacteria towards vaginal area.     -AZO/Pyridium - may take up to 3 times a day, note that this may turn your urine orange    -Avoid sexual intercourse until you have completed your medication.     -Return to ER if any of the following occur: Worsening of symptoms. Fever over 101 F (38.3 C), No improvement by the third day of treatment, Increasing back or abdominal pain, vomiting, unable to keep fluids or medicine down, weakness, dizziness, or fainting, vaginal discharge, pain, redness, or swelling of the vaginal area

## 2022-04-12 NOTE — PROGRESS NOTES
"ASSESSMENT/PLAN:    I have reviewed the nursing notes.  I have reviewed the findings, diagnosis, plan and need for follow up with the patient.    1. Painful urination  - UA reflex to Microscopic and Culture  - Urine Culture  - UTI noted, prior urine culture from 2018 (most recent culture) reviewed - mixed urogenic shauna.   - UTI treated last week with Bactrim PO BID x 5 days, incomplete relief with this. Current exacerbation of UTI.     2. Acute cystitis without hematuria  - nitroFURantoin macrocrystal-monohydrate (MACROBID) 100 MG capsule; Take 1 capsule (100 mg) by mouth in the morning and 1 capsule (100 mg) in the evening. Do all this for 7 days.  Dispense: 14 capsule; Refill: 0  - Vitals stable. PE available for review below. UA results: see below. Based off UA results, patient does meet criteria for antibiotic therapy. I did discuss with patient that if a urine culture was performed, that we will inform patient if a change to their treatment plan needs to occur based off culture results. In the meantime, recommend, alternating tylenol and ibuprofen every 4-6 hours as needed, warm heating pad, pushing fluids/hydration, cranberry juice/pills, urinating when urge arises. May also try over the counter remedies such as Azo (as long as pyridium not already prescribed). Patient aware that if they do take Azo, that this medication can change color of urine to an \"orange\" color. If fevers, chills, flank pain/back pain, inability to urinate/struggle to urinate, signs of dehydration or other worrisome signs occur, patient agreeable to follow up for reevaluation. Patient is in agreement and understanding of the above treatment plan. All questions and concerns were addressed and answered to patient's satisfaction. AVS reviewed with patient.     Discussed warning signs/symptoms indicative of need to f/u    Follow up if symptoms persist or worsen or concerns    I explained my diagnostic considerations and recommendations to " the patient, who voiced understanding and agreement with the treatment plan. All questions were answered. We discussed potential side effects of any prescribed or recommended therapies, as well as expectations for response to treatments.    Nathaly Parra PA-C  4/12/2022  10:50 AM    HPI:    Shayy Stratton is a 46 year old female  who presents to [facility] today for concerns of possible UTI. She recently finished a 5 day course of Bactrim on 4/5/22 per report and symptoms restarted 2-3 days ago.     Symptoms: urgency, frequency, cloudy urine, bladder pain, voiding in small amounts  Flank Pain or Back Pain: No  Blood in Urine: No  Last Urination: [facility]   Able to Completely Urinate/Void: Yes  Prior UTIs: Yes  Exposures to STIs/STDs: No  Fevers, chills, N/V/D: No  Change in Bowel Habits: No  Vaginal Symptoms or Discharge: No  Recent swimming/use of hot tubs/swimming pools/lakes: No    LMP: irregular    Treatments tried: fluids, supplements    Denies CP, SOB, calf tenderness. No new medications. Denies exposure to ill or COVID positive patients.     She has required 7-10 days in the past for antibiotics.     Allergies: UTD in chart    PCP: Awais Romero    Past Medical History:   Diagnosis Date     Asthma 8/31/2012     Chronic/recurrent back pain 2/22/2008     Dyslipidemia 2/26/2007     Frequent UTI     cystoscopy 7/2013,      Geographic tongue      GERD (gastroesophageal reflux disease)      Obesity      Uterine fibroid 03/2019     Past Surgical History:   Procedure Laterality Date     APPENDECTOMY  1979     reflux bladder surgery  1979     TONSILLECTOMY  1990     Social History     Tobacco Use     Smoking status: Never Smoker     Smokeless tobacco: Never Used   Substance Use Topics     Alcohol use: Yes     Alcohol/week: 2.0 standard drinks     Types: 2 Standard drinks or equivalent per week     Comment: occasionally 4-6  drinks weekly     Current Outpatient Medications   Medication Sig Dispense Refill      Acetylcysteine (NAC PO) Take 600 mg by mouth daily       albuterol (PROAIR HFA, PROVENTIL HFA, VENTOLIN HFA) 108 (90 BASE) MCG/ACT inhaler Inhale 2 puffs into the lungs every 4 hours as needed 1 Inhaler 1     Cranberry 450 MG TABS Take  by mouth. daily       D-Mannose POWD Take 1 tsp. by mouth daily       dicyclomine (BENTYL) 20 MG tablet TAKE 1 TABLET BY MOUTH FOUR TIMES A DAY AS NEEDED       methylcellulose (CITRUCEL) powder Take by mouth as needed       multivitamin, therapeutic with minerals (MULTI-VITAMIN) TABS Take 1 tablet by mouth daily       pantoprazole (PROTONIX) 40 MG EC tablet TAKE 1 TABLET (40 MG) BY MOUTH DAILY TAKE 30-60 MINUTES BEFORE A MEAL. 90 tablet 0     Probiotic Product (PROBIOTIC & ACIDOPHILUS EX ST) CAPS Take  by mouth. Daily       Allergies   Allergen Reactions     Levofloxacin Hives     Levaquin - hives on face  Also a reaction to honey seltzer       Acetaminophen Hives and Rash     Honey-[place]       Honey[place] Itching and Rash     Aspirin Hives and Rash     Honey-[place]     Calcium Carbonate Hives and Rash     [place]     Citric Acid Hives and Rash     Honey-[place]     Potassium Bicarbonate Hives and Rash     [place]     Sodium Bicarbonate Hives and Rash     Honey-[place]     Past medical history, past surgical history, current medications and allergies reviewed and accurate to the best of my knowledge.      ROS:  Refer to HPI    Temp 97.6  F (36.4  C) (Tympanic)   Wt 98.9 kg (218 lb)   Breastfeeding No   BMI 38.01 kg/m      EXAM:  General Appearance: Well appearing 46 year old female, appropriate appearance for age. No acute distress   Respiratory: normal chest wall and respirations.  Normal effort.  Clear to auscultation bilaterally, no wheezing, crackles or rhonchi.  No increased work of breathing.  No cough appreciated.  Cardiac: RRR with no murmurs  Abdomen: soft, nontender, no rigidity, no rebound tenderness or guarding, normal bowel sounds present  :  No suprapubic  tenderness to palpation.  Absent CVA tenderness to palpation.    Musculoskeletal:  Equal movement of bilateral upper extremities.  Equal movement of bilateral lower extremities.  Normal gait.    Dermatological: no rashes noted of exposed skin  Psychological: normal affect, alert, oriented, and pleasant.     Labs:  Results for orders placed or performed in visit on 04/12/22   UA reflex to Microscopic and Culture     Status: Abnormal    Specimen: Urine, Midstream   Result Value Ref Range    Color Urine Yellow Colorless, Straw, Light Yellow, Yellow    Appearance Urine Clear Clear    Glucose Urine Negative Negative mg/dL    Bilirubin Urine Negative Negative    Ketones Urine Negative Negative mg/dL    Specific Gravity Urine 1.006 1.000 - 1.030    Blood Urine Negative Negative    pH Urine 6.5 5.0 - 9.0    Protein Albumin Urine Negative Negative mg/dL    Urobilinogen Urine Normal Normal, 2.0 mg/dL    Nitrite Urine Negative Negative    Leukocyte Esterase Urine Moderate (A) Negative    Mucus Urine Present (A) None Seen /LPF    RBC Urine 1 <=2 /HPF    WBC Urine 14 (H) <=5 /HPF    Narrative    Urine Culture ordered based on laboratory criteria     Xray:  None

## 2022-04-14 LAB — BACTERIA UR CULT: ABNORMAL

## 2022-05-13 ENCOUNTER — TELEPHONE (OUTPATIENT)
Dept: FAMILY MEDICINE | Facility: OTHER | Age: 47
End: 2022-05-13
Payer: OTHER GOVERNMENT

## 2022-05-13 NOTE — TELEPHONE ENCOUNTER
Patient called back stating she was told it was going to go on mychart, I do not see this note but wanted me to make sure she gets a call from a nurse.     Debbie Norman on 5/13/2022 at 10:10 AM

## 2022-05-13 NOTE — TELEPHONE ENCOUNTER
Please call the patient with her Lab tests results from April 12.  She wants to know what she is sensitive to?  She also has another UTI.      Brandi Rios on 5/13/2022 at 8:51 AM

## 2022-05-13 NOTE — TELEPHONE ENCOUNTER
Spoke to patient she is out in [place] and still has a UTI, went to a clinic there and they insisted she get Bactrim again even when she told them she's resistant to that and it didn't work last time.  She called here to find out which ones she could take so she could let them know.  Spoke to in house pharmacist and she instructed on what to tell patient.  She reviewed med allergies and said that she would recommend Augmentin or Cephalexin, also gave phone number if they wanted to call and talk to her.  Notified patient.  Rebeka Ramachandran RN on 5/13/2022 at 11:23 AM

## 2022-09-04 ENCOUNTER — HEALTH MAINTENANCE LETTER (OUTPATIENT)
Age: 47
End: 2022-09-04

## 2023-04-29 ENCOUNTER — HEALTH MAINTENANCE LETTER (OUTPATIENT)
Age: 48
End: 2023-04-29

## 2024-07-06 ENCOUNTER — HEALTH MAINTENANCE LETTER (OUTPATIENT)
Age: 49
End: 2024-07-06